# Patient Record
Sex: MALE | Race: WHITE
[De-identification: names, ages, dates, MRNs, and addresses within clinical notes are randomized per-mention and may not be internally consistent; named-entity substitution may affect disease eponyms.]

---

## 2018-04-10 ENCOUNTER — HOSPITAL ENCOUNTER (INPATIENT)
Dept: HOSPITAL 45 - C.EDB | Age: 78
LOS: 9 days | Discharge: HOME | DRG: 286 | End: 2018-04-19
Attending: HOSPITALIST | Admitting: HOSPITALIST
Payer: MEDICARE

## 2018-04-10 VITALS
OXYGEN SATURATION: 96 % | SYSTOLIC BLOOD PRESSURE: 131 MMHG | TEMPERATURE: 99.68 F | DIASTOLIC BLOOD PRESSURE: 81 MMHG | HEART RATE: 67 BPM

## 2018-04-10 VITALS
TEMPERATURE: 97.88 F | SYSTOLIC BLOOD PRESSURE: 132 MMHG | OXYGEN SATURATION: 96 % | HEART RATE: 59 BPM | DIASTOLIC BLOOD PRESSURE: 79 MMHG

## 2018-04-10 VITALS
HEIGHT: 66 IN | WEIGHT: 165.35 LBS | BODY MASS INDEX: 26.57 KG/M2 | BODY MASS INDEX: 26.57 KG/M2 | HEIGHT: 66 IN | WEIGHT: 165.35 LBS

## 2018-04-10 DIAGNOSIS — I35.0: ICD-10-CM

## 2018-04-10 DIAGNOSIS — E87.1: ICD-10-CM

## 2018-04-10 DIAGNOSIS — I11.0: ICD-10-CM

## 2018-04-10 DIAGNOSIS — Z86.73: ICD-10-CM

## 2018-04-10 DIAGNOSIS — Z87.891: ICD-10-CM

## 2018-04-10 DIAGNOSIS — Z98.890: ICD-10-CM

## 2018-04-10 DIAGNOSIS — Z87.01: ICD-10-CM

## 2018-04-10 DIAGNOSIS — Z66: ICD-10-CM

## 2018-04-10 DIAGNOSIS — I50.40: ICD-10-CM

## 2018-04-10 DIAGNOSIS — J18.9: ICD-10-CM

## 2018-04-10 DIAGNOSIS — Z85.038: ICD-10-CM

## 2018-04-10 DIAGNOSIS — I48.2: Primary | ICD-10-CM

## 2018-04-10 DIAGNOSIS — I25.119: ICD-10-CM

## 2018-04-10 DIAGNOSIS — Z91.81: ICD-10-CM

## 2018-04-10 DIAGNOSIS — R33.8: ICD-10-CM

## 2018-04-10 LAB
ALBUMIN SERPL-MCNC: 3 GM/DL (ref 3.4–5)
ALP SERPL-CCNC: 104 U/L (ref 45–117)
ALT SERPL-CCNC: 20 U/L (ref 12–78)
AST SERPL-CCNC: 18 U/L (ref 15–37)
BASOPHILS # BLD: 0.03 K/UL (ref 0–0.2)
BASOPHILS NFR BLD: 0.5 %
BUN SERPL-MCNC: 22 MG/DL (ref 7–18)
CALCIUM SERPL-MCNC: 8.5 MG/DL (ref 8.5–10.1)
CO2 SERPL-SCNC: 24 MMOL/L (ref 21–32)
CREAT SERPL-MCNC: 1.34 MG/DL (ref 0.6–1.4)
EOS ABS #: 0.03 K/UL (ref 0–0.5)
EOSINOPHIL NFR BLD AUTO: 186 K/UL (ref 130–400)
GLUCOSE SERPL-MCNC: 98 MG/DL (ref 70–99)
HCT VFR BLD CALC: 37.2 % (ref 42–52)
HGB BLD-MCNC: 12.4 G/DL (ref 14–18)
IG#: 0.01 K/UL (ref 0–0.02)
IMM GRANULOCYTES NFR BLD AUTO: 6.6 %
INR PPP: 1.1 (ref 0.9–1.1)
LYMPHOCYTES # BLD: 0.42 K/UL (ref 1.2–3.4)
MCH RBC QN AUTO: 30.7 PG (ref 25–34)
MCHC RBC AUTO-ENTMCNC: 33.3 G/DL (ref 32–36)
MCV RBC AUTO: 92.1 FL (ref 80–100)
MONO ABS #: 0.51 K/UL (ref 0.11–0.59)
MONOCYTES NFR BLD: 8 %
NEUT ABS #: 5.41 K/UL (ref 1.4–6.5)
NEUTROPHILS # BLD AUTO: 0.5 %
NEUTROPHILS NFR BLD AUTO: 84.2 %
PMV BLD AUTO: 8.9 FL (ref 7.4–10.4)
POTASSIUM SERPL-SCNC: 4.2 MMOL/L (ref 3.5–5.1)
PROT SERPL-MCNC: 7.3 GM/DL (ref 6.4–8.2)
PTT PATIENT: 33.8 SECONDS (ref 21–31)
RED CELL DISTRIBUTION WIDTH CV: 13.9 % (ref 11.5–14.5)
RED CELL DISTRIBUTION WIDTH SD: 47.3 FL (ref 36.4–46.3)
SODIUM SERPL-SCNC: 133 MMOL/L (ref 136–145)
WBC # BLD AUTO: 6.41 K/UL (ref 4.8–10.8)

## 2018-04-10 RX ADMIN — HEPARIN SODIUM SCH MLS/HR: 5000 INJECTION, SOLUTION INTRAVENOUS at 21:40

## 2018-04-10 RX ADMIN — IPRATROPIUM BROMIDE AND ALBUTEROL SULFATE SCH ML: .5; 3 SOLUTION RESPIRATORY (INHALATION) at 20:00

## 2018-04-10 NOTE — HISTORY AND PHYSICAL
History & Physical


Date & Time of Service:


Apr 10, 2018 at 19:19


Chief Complaint:


Cath Site Pain, Weakness, Cough


Primary Care Physician:


No Doctor, Assigned


History of Present Illness


Source:  patient, clinic records, hospital records


Patient is a pleasant 76 y/o male, with PMHx of urinary retention w/ chronic 

Shane, who presented to the ED because of a cough. Patient is a poor historian, 

no family present. He is visiting his niece from [place]. He states he has a 

productive milky white cough for a few days now. He denies any other 

complaints. He has urinary retention and a chronic Shane in place- he notes 

this AM he had penile spasms, but that has since resolved. He denies following w

/ urology here- his Urologist is in [place]. He states he a fall the other 

night while trying to get out of bed. Denies syncope, lightheadedness/

dizziness. He denies any injuries. Patient denies any fever, chills, sweats, 

lightheadedness, dizziness, vision changes, CP, palpitations, edema, SOB, 

wheezing, abdominal pain, nausea, vomiting, diarrhea, urinary symptoms, melena, 

numbness/tingling, weakness, muscle/joint pain, anxiety/depression, active 

bleeding, or new skin discoloration/changes.





Past Medical/Surgical History


Medical Problems: None 





Surgical History: None





Family History





Cancer


Diabetes mellitus


Heart disease


Hypertension





Social History


Smoking Status:  Former Smoker


Alcohol Use:  none


Drug Use:  marijuana


Marital Status:  


Housing status:  lives with family


Occupational Status:  retired





Allergies


Coded Allergies:  


     No Known Allergies (Unverified , 5/17/14)





Home Medications


Scheduled PRN


Acetaminophen (Tylenol), 650 MG PO BID PRN for Pain or Fever





Physical Exam


Vital Signs











  Date Time  Temp Pulse Resp B/P (MAP) Pulse Ox O2 Delivery O2 Flow Rate FiO2


 


4/10/18 18:30  76 31 149/100 98 Room Air  


 


4/10/18 17:18  83 29 145/97 94 Room Air  


 


4/10/18 17:07  75      


 


4/10/18 15:50 37.5 93 20 121/79 100 Room Air  








General Appearance:  no apparent distress


Head:  normocephalic, atraumatic


Eyes:  normal inspection, PERRL


ENT:  hearing grossly normal


Neck:  supple


Respiratory/Chest:  no respiratory distress, no accessory muscle use, + 

decreased breath sounds (throughout ), + wheezing (mild expiratory wheeze 

throughout )


Cardiovascular:  + irregularly irregular (rate controlled )


Abdomen/GI:  normal bowel sounds, non tender, soft


Back:  normal inspection


Extremities/Musculoskelatal:  no calf tenderness, + swelling (+1 pitting edema 

of BLEs)


Neurologic/Psych:  alert, normal mood/affect, oriented x 3


Skin:  normal color, warm/dry, no rash





Diagnostics


Laboratory Results





Results Past 24 Hours








Test


  4/10/18


16:34 4/10/18


17:30 Range/Units


 


 


White Blood Count 6.41  4.8-10.8  K/uL


 


Red Blood Count 4.04  4.7-6.1  M/uL


 


Hemoglobin 12.4  14.0-18.0  g/dL


 


Hematocrit 37.2  42-52  %


 


Mean Corpuscular Volume 92.1    fL


 


Mean Corpuscular Hemoglobin 30.7  25-34  pg


 


Mean Corpuscular Hemoglobin


Concent 33.3


  


  32-36  g/dl


 


 


Platelet Count 186  130-400  K/uL


 


Mean Platelet Volume 8.9  7.4-10.4  fL


 


Neutrophils (%) (Auto) 84.2   %


 


Lymphocytes (%) (Auto) 6.6   %


 


Monocytes (%) (Auto) 8.0   %


 


Eosinophils (%) (Auto) 0.5   %


 


Basophils (%) (Auto) 0.5   %


 


Neutrophils # (Auto) 5.41  1.4-6.5  K/uL


 


Lymphocytes # (Auto) 0.42  1.2-3.4  K/uL


 


Monocytes # (Auto) 0.51  0.11-0.59  K/uL


 


Eosinophils # (Auto) 0.03  0-0.5  K/uL


 


Basophils # (Auto) 0.03  0-0.2  K/uL


 


RDW Standard Deviation 47.3  36.4-46.3  fL


 


RDW Coefficient of Variation 13.9  11.5-14.5  %


 


Immature Granulocyte % (Auto) 0.2   %


 


Immature Granulocyte # (Auto) 0.01  0.00-0.02  K/uL


 


Prothrombin Time


  12.0


  


  9.0-12.0


SECONDS


 


Prothromb Time International


Ratio 1.1


  


  0.9-1.1  


 


 


Activated Partial


Thromboplast Time 33.8


  


  21.0-31.0


SECONDS


 


Partial Thromboplastin Ratio 1.3   


 


Sodium Level 133  136-145  mmol/L


 


Potassium Level 4.2  3.5-5.1  mmol/L


 


Chloride Level 101    mmol/L


 


Carbon Dioxide Level 24  21-32  mmol/L


 


Anion Gap 8.0  3-11  mmol/L


 


Blood Urea Nitrogen 22  7-18  mg/dl


 


Creatinine


  1.34


  


  0.60-1.40


mg/dl


 


Estimated GFR (


American) 58.0


  


   


 


 


Estimated GFR (Non-


American 50.0


  


   


 


 


BUN/Creatinine Ratio 16.1  10-20  


 


Random Glucose 98  70-99  mg/dl


 


Calcium Level 8.5  8.5-10.1  mg/dl


 


Magnesium Level 2.0  1.8-2.4  mg/dl


 


Total Bilirubin 0.8  0.2-1  mg/dl


 


Direct Bilirubin 0.3  0-0.2  mg/dl


 


Aspartate Amino Transf


(AST/SGOT) 18


  


  15-37  U/L


 


 


Alanine Aminotransferase


(ALT/SGPT) 20


  


  12-78  U/L


 


 


Alkaline Phosphatase 104    U/L


 


Troponin I 0.054  0-0.045  ng/ml


 


Total Protein 7.3  6.4-8.2  gm/dl


 


Albumin 3.0  3.4-5.0  gm/dl


 


Urine Color  DK YELLOW  


 


Urine Appearance  CLOUDY CLEAR  


 


Urine pH  5.5 4.5-7.5  


 


Urine Specific Gravity  1.019 1.000-1.030  


 


Urine Protein  3+ NEG  


 


Urine Glucose (UA)  NEG NEG  


 


Urine Ketones  NEG NEG  


 


Urine Occult Blood  3+ NEG  


 


Urine Nitrite  NEG NEG  


 


Urine Bilirubin  NEG NEG  


 


Urine Urobilinogen  NEG NEG  


 


Urine Leukocyte Esterase  MODERATE NEG  


 


Urine WBC (Auto)  >30 0-5  /hpf


 


Urine RBC (Auto)  >30 0-4  /hpf


 


Urine Hyaline Casts (Auto)  1-5 0-5  /lpf


 


Urine Epithelial Cells (Auto)  5-10 0-5  /lpf


 


Urine Bacteria (Auto)  NEG NEG  








Microbiology Results


4/10/18 Blood Culture, Received


          Pending


4/10/18 Blood Culture, Received


          Pending





Diagnostic Radiology


TWO VIEW CHEST





CLINICAL HISTORY: Cough.





FINDINGS: AP and lateral chest radiographs are obtained. No prior studies are


available for comparison at the time of dictation. The AP views degraded by


patient rotation. The heart is enlarged. There diffuse bilateral airspace


opacities, greatest in the left upper and right lower lobes. No large pleural


effusion is identified.  There is no pneumothorax. The skeletal structures are


osteopenic. Bony thorax appears intact.





IMPRESSION:





1. Cardiomegaly.





2. There are multifocal bilateral airspace opacities. Is present pulmonary edema


and/or multifocal pneumonia. Clinical and laboratory correlation will be


required. Radiographic follow-up to resolution is recommended.











Electronically signed by:  Duc Momin M.D.


4/10/2018 5:52 PM





Dictated Date/Time:  4/10/2018 5:50 PM





 The status of this report is Signed.   


 Draft = Not yet reviewed or approved by Radiologist.  


 Signed = Reviewed and approved by Radiologist.





EKG


BIANKA GOMEZ ID:[id number] 10-APR-2018 16:24:12 [facility]


Atrial fibrillation


Right bundle branch block


Left anterior fascicular block


*** Bifascicular block ***


Septal infarct , age undetermined


Abnormal ECG


No previous ECGs available


25mm/s 10mm/mV 150Hz 8.0 SP2 12SL 241 DRAGAN: 0


Referred by: Referred Self Unconfirmed


Vent. rate 73 BPM


MS interval * ms


QRS duration 154 ms


QT/QTc 440/484 ms


P-R-T axes * -72 78


1940 (77 yr)


Male 


Room:[facility]


Loc:15


Technician:ROSALEE YAÑEZ


Test ind:





BIANKA GOMEZ ID:[id number] 10-APR-2018 18:27:20 [facility]


Undetermined rhythm


Left axis deviation


Right bundle branch block


Abnormal ECG


When compared with ECG of 10-APR-2018 16:24, (unconfirmed)


Current undetermined rhythm precludes rhythm comparison, needs review


25mm/s 10mm/mV 150Hz 8.0 SP2 12SL 241 DRAGAN: 3


Referred by: Referred Self Unconfirmed


Vent. rate 74 BPM


MS interval * ms


QRS duration 136 ms


QT/QTc 436/483 ms


P-R-T axes * -70 62


1940 (77 yr)


Male 


Room:


Loc:15


Technician:Herminia Charles


Test ind:





Impression


Assessment and Plan


Patient is a pleasant 76 y/o male, with PMHx of urinary retention w/ chronic 

Shane, who presented to the ED because of a cough. 





Multifocal PNA vs ?CHF, new onset a.fib- rates controlled, elevated troponin:


- Admit to tele for cardiac monitoring


- Trend cardiac enzymes 


- Obtain ECHO 


- Start IV Heparin gtt with plans to transition to PO anticoagulation 


- IV Metoprolol PRN for HR >120 


- IV Levaquin 


- Nitro and IV Morphine PRN for chest pain 


- DuoNebs QID and PRN for SOB/wheezing 


- BCx, sputum culture, MRSA swab pending 


- Check TSH, lipid panel, hgbA1c 


- Check bNP





Mild hyponatremia: Follow PRP 





Urinary retention w/ indwelling Shane- noted 





HTN: IV Hydralazine PRN 





DVT prophylaxis: Heparin gtt 





Code status: LEVEL V, DNR 





Dispo: Visiting w/ niece, from [place]- PT/OT and CM consulted 





Attending Documentation:


Patient seen and examined, chart reviewed, case discussed with AZAR Beavers and I 

agree with her assessment and plan as documented above.  Briefly, patient is a 

78yo male with history of urinary retention with chronic Shane in place who 

presents to the ER with productive cough x 4 days, decreased appetite and 

penile spasm.


On physical exam he is afebrile, hemodynamically stable.  Pleasant elderly 

gentleman in NAD. HEENT exam is unremarkable.  Neck supple, no JVD.  Heart +S1/

S2, irregularly irregular, 3/6 MAURICIO at apex with radiation to back, no rubs/

gallops.  Lungs with bilateral crackles to mid lung fields.  2+ pitting LE 

edema to the knees equal bilaterally. Shane catheter in place.


Labs significant for normocytic/normochromic anemia, Hg=12.4 and Hct=37.2, 

Elevated troponin at 0.054. +UA.  


CXR suggestive for multifocal PNA.  EKG with AF at 73 with bifascicular block 

and ST depressions


Impression:  78yo male with productive cough, new onset AF and mildly elevated 

troponin


1.    ?PNA vs CHF  check BNP, 2D echo, cultures.  Levaquin for PNA


2.   AF  patient reports no prior cardiac history.  Rate controlled at present.

  Will anticoagulate with heparin gtt for now. Metoprolol PRN  Check TSH, trend 

cardiac enzymes, monitor electrolytes.  Cardiology consultation per discretion 

of day team


3.   Elevated troponin monitor cardiac enzymes, ASA 81mg po daily, Statin 


4.   Remainder of plan as above





Resuscitation Status


LEVEL V, DNR





VTE Prophylaxis


Will order VTE Prophylaxis:  Yes

## 2018-04-10 NOTE — DIAGNOSTIC IMAGING REPORT
TWO VIEW CHEST



CLINICAL HISTORY: Cough.



FINDINGS: AP and lateral chest radiographs are obtained. No prior studies are

available for comparison at the time of dictation. The AP views degraded by

patient rotation. The heart is enlarged. There diffuse bilateral airspace

opacities, greatest in the left upper and right lower lobes. No large pleural

effusion is identified.  There is no pneumothorax. The skeletal structures are

osteopenic. Bony thorax appears intact.



IMPRESSION:



1. Cardiomegaly.



2. There are multifocal bilateral airspace opacities. Is present pulmonary edema

and/or multifocal pneumonia. Clinical and laboratory correlation will be

required. Radiographic follow-up to resolution is recommended.







Electronically signed by:  Duc Momin M.D.

4/10/2018 5:52 PM



Dictated Date/Time:  4/10/2018 5:50 PM

## 2018-04-10 NOTE — EMERGENCY ROOM VISIT NOTE
History


Report prepared by Nader:  Porfirio Mueller


Under the Supervision of:  Dr. Timo Jacinto M.D.


First contact with patient:  16:06


Chief Complaint:  OTHER COMPLAINT


Stated Complaint:  CATH SITE PAIN, WEAKNESS, COUGH





History of Present Illness


The patient is a 77 year old male who presents to the Emergency Room with 

complaints of constant penile pain around the entry site of his urinary 

catheter that began on Friday 4 days ago. He denies any leaking or discharge 

from the penis. The patient has had the catheter placed since 2013, secondary 

to chronic urinary issues. The catheter was most recently changed 2 weeks ago 

by urology. The patient's daughter notes that he does follow with Dr. Garg, 

who the patient will see later this month to see if a suprapubic catheter would 

be more appropriate. In January, 3 months ago, the patient was diagnosed with a 

UTI and chronic kidney failure at a hospital in [place]. He was told that his 

kidney failure was due to Ibuprofen usage. He has not had any Ibuprofen since 

this diagnosis. He has also had a persistent cough that is producing a "milky" 

mucous per the daughter. There has not been any fevers or chest pain.  He has 

been slightly short of breath.  The daughter continued to mention that his 

breathing seemed "faster" last night. The patient did experience a falling 

episode last night as he was trying to get out of bed. He slid down onto his 

but and denies hitting his head or injuring his neck. The swelling in his feet 

seems to be worsening as well.





   Source of History:  patient, family


   Onset:  4 days ago


   Position:  other ()


   Quality:  other (Penis pain around catheter site)


   Timing:  constant


   Associated Symptoms:  + cough, + SOB, No fevers, No headache, No neck pain, 

No chest pain





Review of Systems


See HPI for pertinent positives & negatives. A total of 10 systems reviewed and 

were otherwise negative.





Past Medical & Surgical


Medical Problems:


(1) A-fib


(2) HTN (hypertension)


(3) PNA (pneumonia)


(4) Skin problems


(5) Stomach problems


(6) Ulcer


(7) Urinary problem








Family History





Cancer


Diabetes mellitus


Heart disease


Hypertension





Social History


Smoking Status:  Former Smoker


Marital Status:  


Housing Status:  unknown


Occupation Status:  retired





Current/Historical Medications


Scheduled PRN


Acetaminophen (Tylenol), 650 MG PO BID PRN for Pain or Fever





Allergies


Coded Allergies:  


     No Known Allergies (Unverified , 5/17/14)





Physical Exam


Vital Signs











  Date Time  Temp Pulse Resp B/P (MAP) Pulse Ox O2 Delivery O2 Flow Rate FiO2


 


4/10/18 18:30  76 31 149/100 98 Room Air  


 


4/10/18 17:18  83 29 145/97 94 Room Air  


 


4/10/18 17:07  75      


 


4/10/18 15:50 37.5 93 20 121/79 100 Room Air  











Physical Exam





Constitutional: Vital signs reviewed.


Eyes: Pupils are equal round reactive to light.  Conjunctiva are noninjected.  


ENT: Pharynx is clear without erythema or exudate.  Mucous membranes are moist.

  Neck supple without meningeal signs.


Respiratory: Clear to auscultation bilaterally.  Breath sounds are equal 

bilaterally. 


Cardiovascular: Regular rate and rhythm.  No rubs or gallops.


GI: Soft, nondistended and nontender.  Bowel sounds are present.


Musculoskeletal: Mild bilateral pedal edema.  No lower extremity tenderness. No 

flank or rib tenderness. 


Integumentary: No cyanosis.


Neurological: The patient is awake and alert.  No focal deficits.


Psychiatric: Normal affect.


: The phallus shows some whitish coating, with some mild erythema and 

tenderness. There is a Shane catheter in place without any bleeding or 

discharge.





Medical Decision & Procedures


ER Provider


Diagnostic Interpretation:


Radiology results as stated below per my review and the radiologist's 

interpretation:





TWO VIEW CHEST





CLINICAL HISTORY: Cough.





FINDINGS: AP and lateral chest radiographs are obtained. No prior studies are


available for comparison at the time of dictation. The AP views degraded by


patient rotation. The heart is enlarged. There diffuse bilateral airspace


opacities, greatest in the left upper and right lower lobes. No large pleural


effusion is identified.  There is no pneumothorax. The skeletal structures are


osteopenic. Bony thorax appears intact.





IMPRESSION:





1. Cardiomegaly.





2. There are multifocal bilateral airspace opacities. Is present pulmonary edema


and/or multifocal pneumonia. Clinical and laboratory correlation will be


required. Radiographic follow-up to resolution is recommended.











Electronically signed by:  Duc Momin M.D.


4/10/2018 5:52 PM





Dictated Date/Time:  4/10/2018 5:50 PM





Laboratory Results


4/10/18 16:34








Red Blood Count 4.04, Mean Corpuscular Volume 92.1, Mean Corpuscular Hemoglobin 

30.7, Mean Corpuscular Hemoglobin Concent 33.3, Mean Platelet Volume 8.9, 

Neutrophils (%) (Auto) 84.2, Lymphocytes (%) (Auto) 6.6, Monocytes (%) (Auto) 

8.0, Eosinophils (%) (Auto) 0.5, Basophils (%) (Auto) 0.5, Neutrophils # (Auto) 

5.41, Lymphocytes # (Auto) 0.42, Monocytes # (Auto) 0.51, Eosinophils # (Auto) 

0.03, Basophils # (Auto) 0.03





4/10/18 16:34

















Test


  4/10/18


16:34 4/10/18


17:30


 


White Blood Count


  6.41 K/uL


(4.8-10.8) 


 


 


Red Blood Count


  4.04 M/uL


(4.7-6.1) 


 


 


Hemoglobin


  12.4 g/dL


(14.0-18.0) 


 


 


Hematocrit 37.2 % (42-52)  


 


Mean Corpuscular Volume


  92.1 fL


() 


 


 


Mean Corpuscular Hemoglobin


  30.7 pg


(25-34) 


 


 


Mean Corpuscular Hemoglobin


Concent 33.3 g/dl


(32-36) 


 


 


Platelet Count


  186 K/uL


(130-400) 


 


 


Mean Platelet Volume


  8.9 fL


(7.4-10.4) 


 


 


Neutrophils (%) (Auto) 84.2 %  


 


Lymphocytes (%) (Auto) 6.6 %  


 


Monocytes (%) (Auto) 8.0 %  


 


Eosinophils (%) (Auto) 0.5 %  


 


Basophils (%) (Auto) 0.5 %  


 


Neutrophils # (Auto)


  5.41 K/uL


(1.4-6.5) 


 


 


Lymphocytes # (Auto)


  0.42 K/uL


(1.2-3.4) 


 


 


Monocytes # (Auto)


  0.51 K/uL


(0.11-0.59) 


 


 


Eosinophils # (Auto)


  0.03 K/uL


(0-0.5) 


 


 


Basophils # (Auto)


  0.03 K/uL


(0-0.2) 


 


 


RDW Standard Deviation


  47.3 fL


(36.4-46.3) 


 


 


RDW Coefficient of Variation


  13.9 %


(11.5-14.5) 


 


 


Immature Granulocyte % (Auto) 0.2 %  


 


Immature Granulocyte # (Auto)


  0.01 K/uL


(0.00-0.02) 


 


 


Prothrombin Time


  12.0 SECONDS


(9.0-12.0) 


 


 


Prothromb Time International


Ratio 1.1 (0.9-1.1) 


  


 


 


Activated Partial


Thromboplast Time 33.8 SECONDS


(21.0-31.0) 


 


 


Partial Thromboplastin Ratio 1.3  


 


Anion Gap


  8.0 mmol/L


(3-11) 


 


 


Estimated GFR (


American) 58.0 


  


 


 


Estimated GFR (Non-


American 50.0 


  


 


 


BUN/Creatinine Ratio 16.1 (10-20)  


 


Calcium Level


  8.5 mg/dl


(8.5-10.1) 


 


 


Magnesium Level


  2.0 mg/dl


(1.8-2.4) 


 


 


Total Bilirubin


  0.8 mg/dl


(0.2-1) 


 


 


Direct Bilirubin


  0.3 mg/dl


(0-0.2) 


 


 


Aspartate Amino Transf


(AST/SGOT) 18 U/L (15-37) 


  


 


 


Alanine Aminotransferase


(ALT/SGPT) 20 U/L (12-78) 


  


 


 


Alkaline Phosphatase


  104 U/L


() 


 


 


Troponin I


  0.054 ng/ml


(0-0.045) 


 


 


Pro-B-Type Natriuretic Peptide


  > 02620 pg/ml


(0-1800) 


 


 


Total Protein


  7.3 gm/dl


(6.4-8.2) 


 


 


Albumin


  3.0 gm/dl


(3.4-5.0) 


 


 


Thyroid Stimulating Hormone


(TSH) 0.787 uIu/ml


(0.300-4.500) 


 


 


Urine Color  DK YELLOW 


 


Urine Appearance  CLOUDY (CLEAR) 


 


Urine pH  5.5 (4.5-7.5) 


 


Urine Specific Gravity


  


  1.019


(1.000-1.030)


 


Urine Protein  3+ (NEG) 


 


Urine Glucose (UA)  NEG (NEG) 


 


Urine Ketones  NEG (NEG) 


 


Urine Occult Blood  3+ (NEG) 


 


Urine Nitrite  NEG (NEG) 


 


Urine Bilirubin  NEG (NEG) 


 


Urine Urobilinogen  NEG (NEG) 


 


Urine Leukocyte Esterase  MODERATE (NEG) 


 


Urine WBC (Auto)  >30 /hpf (0-5) 


 


Urine RBC (Auto)  >30 /hpf (0-4) 


 


Urine Hyaline Casts (Auto)  1-5 /lpf (0-5) 


 


Urine Epithelial Cells (Auto)


  


  5-10 /lpf


(0-5)


 


Urine Bacteria (Auto)  NEG (NEG) 





Laboratory results as reviewed by me.





Medications Administered











 Medications


  (Trade)  Dose


 Ordered  Sig/Hudson


 Route  Start Time


 Stop Time Status Last Admin


Dose Admin


 


 Levofloxacin


  (Levaquin / D5W)  750 mg  NOW  STAT


 IV  4/10/18 18:08


 4/10/18 18:09 DC 4/10/18 18:42


750 MG


 


 Aspirin


  (Aspirin Chew)  324 mg  NOW  STAT


 PO  4/10/18 18:19


 4/10/18 18:20 DC 4/10/18 18:42


324 MG











ECG Per My Interpretation


Indication:  SOB/dyspnea


Rate (beats per minute):  73


Rhythm:  atrial fibrillation


Findings:  other (bifascicular block with ST depression, biphasic t-waves 

septally. )


Change:


REPEAT EKG: Shows sinus rhythm with frequent PVCs, atrial-fibrillation 74, RBBB

, PVCs present, ST depressions resolved.





ED Course


1609: The patient was evaluated in room B12B. A complete history and physical 

exam was performed.





1808: Ordered Levaquin 750 mg IV. 





1812: I discussed the case with the patient and his daughter. He is not, and 

has not, had any chest discomfort. 





1819: Ordered Aspirin 324 mg PO. 





1843: I discussed the case with the patient at this time. There is no prior 

history of atrial fibrillation or contraindication for anticoagulation therapy. 

I discussed the ECG changes with him. I discussed the case with Dr. Blaine DELGADO Hospitalist. She states she would start the patient on anticoagulation 

therapy after addressing his fall risk.





Medical Decision


This is a 77-year-old male who presents with cough, shortness of breath, pain 

at his Shane catheter.  Differential diagnosis includes pneumonia, cardiac, UTI

, balanitis, bronchitis.  I did perform a limited focused review of portions of 

the patient's old chart on the electronic medical record. The patient has had 

no recent pertinent visits to this hospital.





I did evaluate the patient as noted above.  The patient has several issues 

today.  He has pain on his Shane catheter.  He appears to have some balanitis.  

His Shane catheter was changed.  He also complains of cough with shortness of 

breath.  His family has been ill with similar symptoms.  IV access was 

established.  The patient was placed on a continuous cardiac monitor.  I did 

order and personally review the patient's 12-lead EKG and chest x-ray as 

described above.  His twelve-lead EKG shows atrial fibrillation.  He states he 

has never had atrial fibrillation.  His daughter confirms this.  He also has 

some ST depressions and biphasic T waves.  His chest x-ray shows multifocal 

pneumonia.  Blood cultures were ordered.  I did treat the patient with IV 

Levaquin.  I did order and review the patient's blood work as noted in the 

electronic medical record.  His troponin is elevated.  I did reassess the 

patient.  I did discuss the test results with the patient.  He denies ever 

having any chest discomfort.  He denies having atrial fibrillation.  I did 

repeat another twelve-lead EKG which showed improvement of the ST elevations 

but he did have continued atrial fibrillation.  I did recommend admission to 

the hospital for IV antibiotics as well as cardiology consultation and 

anticoagulation.  I did discuss the case with the hospitalist and .

  The hospitalist will determine which anticoagulant to start.





Medication Reconcilliation


Current Medication List:  was personally reviewed by me





Blood Pressure Screening


Patient's blood pressure:  Normal blood pressure





Consults


Time Called:  1838


Consulting Physician:  Dr. Blaine DELGADO Hospitalist


Returned Call:  [phone]


Dr. Blaine DELGADO Hospitalist. She states she would start the patient on 

anticoagulation therapy after addressing his fall risk.





Impression





 Primary Impression:  


 Multifocal pneumonia


 Additional Impressions:  


 Abnormal ECG


 Elevated troponin I level


 Catheter-associated urinary tract infection


 New onset a-fib





Scribe Attestation


The scribe's documentation has been prepared under my direct and personally 

reviewed by me in its entirety. I confirm that the note above accurately 

reflects all work, treatment, procedures, and medical decision making performed 

by me.





Departure Information


Dispostion


Being Evaluated By Hospitalist





Referrals


No Doctor, Assigned (PCP)





Patient Instructions


My [facility]





Problem Qualifiers








 Additional Impressions:  


 Catheter-associated urinary tract infection


 Indwelling urinary catheter type:  indwelling urethral catheter  Encounter type

:  initial encounter  Qualified Codes:  T83.511A - Infection and inflammatory 

reaction due to indwelling urethral catheter, initial encounter; N39.0 - 

Urinary tract infection, site not specified

## 2018-04-11 VITALS
DIASTOLIC BLOOD PRESSURE: 92 MMHG | OXYGEN SATURATION: 94 % | TEMPERATURE: 98.24 F | HEART RATE: 66 BPM | SYSTOLIC BLOOD PRESSURE: 152 MMHG

## 2018-04-11 VITALS
SYSTOLIC BLOOD PRESSURE: 136 MMHG | TEMPERATURE: 98.78 F | DIASTOLIC BLOOD PRESSURE: 74 MMHG | HEART RATE: 57 BPM | OXYGEN SATURATION: 96 %

## 2018-04-11 VITALS
OXYGEN SATURATION: 99 % | SYSTOLIC BLOOD PRESSURE: 170 MMHG | DIASTOLIC BLOOD PRESSURE: 97 MMHG | HEART RATE: 67 BPM | TEMPERATURE: 98.24 F

## 2018-04-11 VITALS
TEMPERATURE: 98.06 F | OXYGEN SATURATION: 97 % | HEART RATE: 57 BPM | DIASTOLIC BLOOD PRESSURE: 81 MMHG | SYSTOLIC BLOOD PRESSURE: 178 MMHG

## 2018-04-11 VITALS — HEART RATE: 78 BPM | OXYGEN SATURATION: 96 %

## 2018-04-11 VITALS
DIASTOLIC BLOOD PRESSURE: 91 MMHG | SYSTOLIC BLOOD PRESSURE: 141 MMHG | TEMPERATURE: 99.5 F | HEART RATE: 64 BPM | OXYGEN SATURATION: 96 %

## 2018-04-11 VITALS
TEMPERATURE: 99.68 F | OXYGEN SATURATION: 98 % | DIASTOLIC BLOOD PRESSURE: 87 MMHG | SYSTOLIC BLOOD PRESSURE: 147 MMHG | HEART RATE: 65 BPM

## 2018-04-11 VITALS — OXYGEN SATURATION: 97 %

## 2018-04-11 VITALS — OXYGEN SATURATION: 96 % | HEART RATE: 75 BPM

## 2018-04-11 VITALS — OXYGEN SATURATION: 98 %

## 2018-04-11 VITALS — OXYGEN SATURATION: 99 %

## 2018-04-11 VITALS — HEART RATE: 76 BPM | OXYGEN SATURATION: 98 %

## 2018-04-11 LAB
BUN SERPL-MCNC: 22 MG/DL (ref 7–18)
CALCIUM SERPL-MCNC: 8.7 MG/DL (ref 8.5–10.1)
CK MB SERPL-MCNC: 0.6 NG/ML (ref 0.5–3.6)
CK MB SERPL-MCNC: 0.9 NG/ML (ref 0.5–3.6)
CO2 SERPL-SCNC: 24 MMOL/L (ref 21–32)
CREAT SERPL-MCNC: 1.27 MG/DL (ref 0.6–1.4)
EOSINOPHIL NFR BLD AUTO: 174 K/UL (ref 130–400)
GLUCOSE SERPL-MCNC: 84 MG/DL (ref 70–99)
HBA1C MFR BLD: 5 % (ref 4.5–5.6)
HCT VFR BLD CALC: 36.1 % (ref 42–52)
HGB BLD-MCNC: 12 G/DL (ref 14–18)
KETONES UR QL STRIP: 33 MG/DL
MCH RBC QN AUTO: 30.3 PG (ref 25–34)
MCHC RBC AUTO-ENTMCNC: 33.2 G/DL (ref 32–36)
MCV RBC AUTO: 91.2 FL (ref 80–100)
PH UR: 101 MG/DL (ref 0–200)
PMV BLD AUTO: 8.7 FL (ref 7.4–10.4)
POTASSIUM SERPL-SCNC: 4.1 MMOL/L (ref 3.5–5.1)
PTT PATIENT: 47.4 SECONDS (ref 21–31)
RED CELL DISTRIBUTION WIDTH CV: 13.8 % (ref 11.5–14.5)
RED CELL DISTRIBUTION WIDTH SD: 46.5 FL (ref 36.4–46.3)
SODIUM SERPL-SCNC: 132 MMOL/L (ref 136–145)
WBC # BLD AUTO: 4.87 K/UL (ref 4.8–10.8)

## 2018-04-11 RX ADMIN — HEPARIN SODIUM SCH MLS/HR: 5000 INJECTION, SOLUTION INTRAVENOUS at 18:05

## 2018-04-11 RX ADMIN — Medication SCH MG: at 07:45

## 2018-04-11 RX ADMIN — IPRATROPIUM BROMIDE AND ALBUTEROL SULFATE SCH ML: .5; 3 SOLUTION RESPIRATORY (INHALATION) at 11:38

## 2018-04-11 RX ADMIN — ACETYLCYSTEINE SCH MG: 100 INHALANT RESPIRATORY (INHALATION) at 20:45

## 2018-04-11 RX ADMIN — IPRATROPIUM BROMIDE AND ALBUTEROL SULFATE SCH ML: .5; 3 SOLUTION RESPIRATORY (INHALATION) at 15:16

## 2018-04-11 RX ADMIN — IPRATROPIUM BROMIDE AND ALBUTEROL SULFATE SCH ML: .5; 3 SOLUTION RESPIRATORY (INHALATION) at 07:05

## 2018-04-11 RX ADMIN — IPRATROPIUM BROMIDE AND ALBUTEROL SULFATE SCH ML: .5; 3 SOLUTION RESPIRATORY (INHALATION) at 19:04

## 2018-04-11 NOTE — CARDIOLOGY CONSULTATION
Cardiology Consultation


Date of Consultation:


Apr 11, 2018.


Requesting Physician:


Deann Weathers


Reason for Consultation:


AF, AS


Pt evaluation today including:  conversation w/ patient, conversation w/ family

, physical exam, lab review, review of studies, review of inpatient medication 

list


History of Present Illness


This is a very pleasant 77-year-old gentleman who lives in [place] but has had 

healthcare in other states but is currently visiting his niece here in 

[place].  He has had urologic problems due to an indwelling catheter, but 

has not been seen by cardiology locally.  I do not have good records of his 

prior cardiovascular history, he initially told me that he was not aware of 

having any trouble with his heart however his niece tells me that he was 

evaluated in [place] and was told that he did have some cardiac problems (she 

did not know specifics) but refused evaluation at that time.





He describes doing well physically until the last year so when he has had a lot 

of difficulties.  Specifically he has less ability to do activities then he 

could in the past, he really cannot live by himself anymore and he has 

difficulty raising his right arm overhead and his speech has become more 

difficult to understand according to his knees.  All of these things occurred 

over the last year, perhaps 6 months.  He does not have lightheadedness or 

dizziness, he has no awareness of his heart rhythm and he does not have 

exertional chest discomfort that he will admit.  On admission here for urologic 

issues he was noted to be in atrial fibrillation and have some congestive heart 

failure findings on chest x-ray.  He also has significant valvular heart 

disease.





Past Medical/Surgical History





(1) HTN (hypertension)


(2) Ulcer


(3) Urinary problem





Family History





Cancer


Diabetes mellitus


Heart disease


Hypertension





Social History


Smoking Status:  Former Smoker


History of Alcohol Use:  No





Review of Systems


Constitutional:  No fever, No weight loss, No weakness


Respiratory:  + shortness of breath, + dyspnea on exertion, No cough, No 

wheezing


Cardiac:  No chest pain, No orthopnea, No PND, No edema, No palpitations


Abdomen:  No pain, No nausea, No vomiting, No diarrhea, No GI bleeding


Male :  No urinary frequency, No nocturia more than once/night, No slowing 

stream, No sexual dysfunction


Neurologic:  No paralysis, No weakness, No numbness/tingling, No balance 

problems


Heme:  No abnormal bleeding/bruising, No clotting problems


Endo:  No fatigue


Skin:  No problem reported


Inability to raise his right arm above his shoulder or use it normally


All Other Systems:  Reviewed and Negative





Allergies


Coded Allergies:  


     No Known Allergies (Unverified , 5/17/14)





Medications





Current Inpatient Medications








 Medications


  (Trade)  Dose


 Ordered  Sig/Hudson


 Route  Start Time


 Stop Time Status Last Admin


Dose Admin


 


 Acetaminophen


  (Tylenol Tab)  650 mg  Q4H  PRN


 PO  4/10/18 19:15


 5/10/18 19:14   


 


 


 Al Hydrox/Mg


 Hydrox/Simethicone


  (Maalox Max Susp)  15 ml  Q4H  PRN


 PO  4/10/18 19:15


 5/10/18 19:14   


 


 


 Magnesium


 Hydroxide


  (Milk Of


 Magnesia Susp)  30 ml  Q12H  PRN


 PO  4/10/18 19:15


 5/10/18 19:14   


 


 


 Ondansetron HCl


  (Zofran Inj)  4 mg  Q6H  PRN


 IV  4/10/18 19:15


 5/10/18 19:14   


 


 


 Nitroglycerin


  (Nitrostat Tab)  0.4 mg  UD  PRN


 SL  4/10/18 19:15


 5/10/18 19:14   


 


 


 Morphine Sulfate


  (MoRPHine


 SULFATE INJ)  2 mg  Q30M  PRN


 IV  4/10/18 19:15


 4/24/18 19:14   


 


 


 Polyethylene


  (Miralax Powder


 Packet)  17 gm  DAILY  PRN


 PO  4/10/18 19:15


 5/10/18 19:14   


 


 


 Albuterol/


 Ipratropium


  (Duoneb)  3 ml  QIDR


 INH  4/10/18 20:00


 5/10/18 19:59  4/11/18 11:38


3 ML


 


 Albuterol/


 Ipratropium


  (Duoneb)  3 ml  Q4H  PRN


 INH  4/10/18 19:15


 5/10/18 19:14   


 


 


 Levofloxacin 750


 mg/Prmx  150 ml @ 


 100 mls/hr  Q24H


 IV  4/11/18 18:00


 4/17/18 17:59   


 


 


 Hydralazine HCl


  (HydrALAZINE INJ)  10 mg  Q6H  PRN


 IV.  4/10/18 19:15


 5/10/18 19:14   


 


 


 Metoprolol


 Tartrate


  (Lopressor Iv)  5 mg  Q6H  PRN


 IV  4/10/18 19:30


 5/10/18 19:29   


 


 


 Aspirin


  (Aspirin Chew)  81 mg  DAILY


 PO  4/11/18 09:00


 5/11/18 08:59  4/11/18 07:45


81 MG


 


 Atorvastatin


 Calcium


  (Lipitor Tab)  80 mg  QAM


 PO  4/11/18 09:00


 5/11/18 08:59  4/11/18 07:45


80 MG


 


 Heparin Sodium/


 Dextrose  500 ml @ 


 24 mls/hr  [id number]


 IV  4/10/18 21:15


 5/10/18 21:14  4/10/18 21:40


24 MLS/HR











Physical Exam





Vital Signs Past 12 Hours








  Date Time  Temp Pulse Resp B/P (MAP) Pulse Ox O2 Delivery O2 Flow Rate FiO2


 


4/11/18 11:38  75 16  96 Room Air  


 


4/11/18 11:27 37.5 64 19 141/91 (108) 96 Room Air  


 


4/11/18 08:00     97 Room Air  


 


4/11/18 07:48 36.7 57 18 178/81 (113) 97   


 


4/11/18 07:09  78 18  96 Room Air  


 


4/11/18 04:00      Room Air  


 


4/11/18 03:40 36.8 66 20 152/92 (112) 94 Room Air  


 


4/10/18 23:59      Room Air  








Constitutional:  


   General Apperance:  heathly-appearing


   Level of Distress:  NAD


Psychiatric:  


   Mental Status:  active & alert


Head:  normocephalic


Eyes:  


   EOM:  EOMI


ENMT:  normal ENT inspection, hearing grossly normal


Neck:  supple, no masses


Lungs:  


   Respiratory effort:  no dyspnea, good air movement


   Auscultation:  no wheezing, rales/crackles on the left, rales/crackles on 

the right


Cardiovascular:  


   Heart Auscultation:  no rubs, no gallops, II/VI MAURICIO, II/VI WSM, irregular 

rate rhythm


Peripheral Pulses:  


   Bruits:  none appreciated


Abdomen:  


   Bowel Sounds:  normal


   Inspection & Palpation:  soft, no tenderness, guarding & rebound, no masses


Musculoskeletal:  pertinent finding (He cannot lift his right arm very well 

without assisting with his left, he does not seem to have a normal right hand 

 either)


Extremities:  no edema


Neurologic:  


   Cranial Nerves:  grossly intact


   Sensation:  grossly intact





Data


Laboratory Results:





Last 24 Hours








Test


  4/10/18


16:34 4/10/18


17:30 4/11/18


00:18 4/11/18


03:30


 


White Blood Count 6.41 K/uL    4.87 K/uL 


 


Red Blood Count 4.04 M/uL    3.96 M/uL 


 


Hemoglobin 12.4 g/dL    12.0 g/dL 


 


Hematocrit 37.2 %    36.1 % 


 


Mean Corpuscular Volume 92.1 fL    91.2 fL 


 


Mean Corpuscular Hemoglobin 30.7 pg    30.3 pg 


 


Mean Corpuscular Hemoglobin


Concent 33.3 g/dl 


  


  


  33.2 g/dl 


 


 


Platelet Count 186 K/uL    174 K/uL 


 


Mean Platelet Volume 8.9 fL    8.7 fL 


 


Neutrophils (%) (Auto) 84.2 %    


 


Lymphocytes (%) (Auto) 6.6 %    


 


Monocytes (%) (Auto) 8.0 %    


 


Eosinophils (%) (Auto) 0.5 %    


 


Basophils (%) (Auto) 0.5 %    


 


Neutrophils # (Auto) 5.41 K/uL    


 


Lymphocytes # (Auto) 0.42 K/uL    


 


Monocytes # (Auto) 0.51 K/uL    


 


Eosinophils # (Auto) 0.03 K/uL    


 


Basophils # (Auto) 0.03 K/uL    


 


RDW Standard Deviation 47.3 fL    46.5 fL 


 


RDW Coefficient of Variation 13.9 %    13.8 % 


 


Immature Granulocyte % (Auto) 0.2 %    


 


Immature Granulocyte # (Auto) 0.01 K/uL    


 


Prothrombin Time 12.0 SECONDS    


 


Prothromb Time International


Ratio 1.1 


  


  


  


 


 


Activated Partial


Thromboplast Time 33.8 SECONDS 


  


  


  47.4 SECONDS 


 


 


Partial Thromboplastin Ratio 1.3    1.8 


 


Sodium Level 133 mmol/L    132 mmol/L 


 


Potassium Level 4.2 mmol/L    4.1 mmol/L 


 


Chloride Level 101 mmol/L    101 mmol/L 


 


Carbon Dioxide Level 24 mmol/L    24 mmol/L 


 


Anion Gap 8.0 mmol/L    7.0 mmol/L 


 


Blood Urea Nitrogen 22 mg/dl    22 mg/dl 


 


Creatinine 1.34 mg/dl    1.27 mg/dl 


 


Estimated GFR (


American) 58.0 


  


  


  61.9 


 


 


Estimated GFR (Non-


American 50.0 


  


  


  53.4 


 


 


BUN/Creatinine Ratio 16.1    17.5 


 


Random Glucose 98 mg/dl    84 mg/dl 


 


Calcium Level 8.5 mg/dl    8.7 mg/dl 


 


Magnesium Level 2.0 mg/dl    


 


Total Bilirubin 0.8 mg/dl    


 


Direct Bilirubin 0.3 mg/dl    


 


Aspartate Amino Transf


(AST/SGOT) 18 U/L 


  


  


  


 


 


Alanine Aminotransferase


(ALT/SGPT) 20 U/L 


  


  


  


 


 


Alkaline Phosphatase 104 U/L    


 


Troponin I 0.054 ng/ml   0.063 ng/ml  


 


Pro-B-Type Natriuretic Peptide > 70362 pg/ml    


 


Total Protein 7.3 gm/dl    


 


Albumin 3.0 gm/dl    


 


Thyroid Stimulating Hormone


(TSH) 0.787 uIu/ml 


  


  


  


 


 


Urine Color  DK YELLOW   


 


Urine Appearance  CLOUDY   


 


Urine pH  5.5   


 


Urine Specific Gravity  1.019   


 


Urine Protein  3+   


 


Urine Glucose (UA)  NEG   


 


Urine Ketones  NEG   


 


Urine Occult Blood  3+   


 


Urine Nitrite  NEG   


 


Urine Bilirubin  NEG   


 


Urine Urobilinogen  NEG   


 


Urine Leukocyte Esterase  MODERATE   


 


Urine WBC (Auto)  >30 /hpf   


 


Urine RBC (Auto)  >30 /hpf   


 


Urine Hyaline Casts (Auto)  1-5 /lpf   


 


Urine Epithelial Cells (Auto)  5-10 /lpf   


 


Urine Bacteria (Auto)  NEG   


 


Creatine Kinase MB   0.9 ng/ml  


 


Creatine Kinase MB Ratio     


 


Est Creatinine Clear Calc


Drug Dose 


  


  


  46.4 ml/min 


 


 


Estimated Average Glucose    97 mg/dl 


 


Hemoglobin A1c    5.0 % 


 


Triglycerides Level    49 mg/dl 


 


Cholesterol Level    101 mg/dl 


 


HDL Cholesterol    58 mg/dl 


 


LDL Cholesterol, Calculated    33 mg/dl 


 


VLDL Cholesterol, Calculated    10 mg/dl 


 


Cholesterol/HDL Ratio    1.7 


 


Test


  4/11/18


08:26 


  


  


 


 


Creatine Kinase MB 0.6 ng/ml    


 


Creatine Kinase MB Ratio     


 


Troponin I 0.048 ng/ml    








Imaging: Chest x-ray shows cardiomegaly and what appears to be congestive heart 

failure signs





EKG: Atrial fibrillation with left anterior fascicular block and right bundle 

branch block, heart rate controlled





Telemetry reviewed: Atrial fibrillation with a well-controlled heart rate





Echocardiography: His echocardiogram shows normal left ventricular size, mild 

left ventricular hypertrophy, left ventricular dysfunction with wall motion 

abnormalities.  The left ventricular ejection fraction is 35-40%, he has severe 

aortic stenosis and mild to moderate aortic insufficiency as well as mild 

mitral regurgitation.





Assessment & Plan


1.  Aortic stenosis: He has severe aortic stenosis, to the point where he 

likely will need valve replacement.  I do not know how long this has been known

, it evidently was identified in [place] relatively recently, possibly before 

that as we do not have records.  In the past he has refused further evaluation.

  I discussed that with him, he told me that he was not afraid to die and I 

explained to him that it would be a slow process but would get progressively 

worse.  I recommended catheterization if he is interested in further treatment 

which would involve some type of valve replacement.  He is going to think about 

it and we can do the catheterization here if he would like.  His niece was 

present during the discussion and would like him to proceed.





2.  Atrial fibrillation: I do not know how long he has had atrial fibrillation, 

it could be recent or could be long-standing.  His rate is well controlled and 

he is not aware of it.  He is now on an anticoagulant but was not as an 

outpatient.  I agree with the use of heparin now, we should switch him to an 

oral anticoagulant and he should be on long-term anticoagulation.  Rate control 

does not seem to be an issue at rest, we may have to evaluate that with 

exertion.





3.  Cardiomyopathy: He has at least a moderate cardiomyopathy with ejection 

fraction 35-40%.  This may be related to his aortic stenosis, however he has 

wall motion abnormalities and conduction abnormalities.  We should exclude 

ischemic heart disease as a cause which we can identify at catheterization.  

Unless he wants to proceed with further evaluation or intervention I do not 

think there is any role for noninvasive testing.





4.  Right arm weakness and dysarthria: His niece reports that his speech is 

much more difficult to understand over the last 6-12 months and he notes 

difficulty using his arm over that same period of time.  Although he attributes 

this to some type of shoulder injury I am concerned that he could have had a 

stroke.  I am going to get a CT scan to see whether he has an old stroke which 

would be helpful to know.





Thank you for allowing me to participate in his care.

## 2018-04-11 NOTE — HOSPITALIST PROGRESS NOTE
Hospitalist Progress Note


Date of Service


2018.


 (Herminia Taylor ., PA-C)





Subjective


Pt evaluation today including:  conversation w/ patient, conversation w/ family 

(niece at bedside), physical exam, chart review, lab review, review of studies, 

conversation w/ consultant (cardiology), review of inpatient medication list


Pain:  None


PO Intake:  NPO


Voiding:  cabrera catheter in place (chronic)


The patient reports feeling well. He states he has intermittent left sided 

chest pressure, but denies any currently.  He denies any shortness of breath.  

He has had a productive cough recently but this seems improved.  He denies any 

previous cardiac history to his knowledge, but states he has had high blood 

pressure in the past.  He denies any current medications at home except for 

Aleve.  He states he had a history of prediabetes at one point but his A1c is 

normal now.  He also notes a history of colon cancer that was diagnosed in 

.  He had a colon resection at that time and did not require any chemo/

radiation.  He has a h/o chronic urinary retention w/chronic Cabrera.  The 

patient is NPO at the time of my visit and is hungry.  1 day prior to arrival, 

he had poor appetite and ate very little. The patient denies fevers, chills, 

sweats, chest pain, palpitations, claudication, wheezing, shortness of breath, 

nausea, vomiting, abdominal pain, dysuria, hematuria, paralysis, weakness, 

numbness and tingling.





   Additional Comments:


See HPI for pertinent positives and negatives.  All other systems reviewed and 

negative.


 (Herminia Taylor ., PA-C)





Objective


Vital Signs











  Date Time  Temp Pulse Resp B/P (MAP) Pulse Ox O2 Delivery O2 Flow Rate FiO2


 


18 12:00     97 Room Air  


 


18 11:38  75 16  96 Room Air  


 


18 11:27 37.5 64 19 141/91 (108) 96 Room Air  


 


18 08:00     97 Room Air  


 


18 07:48 36.7 57 18 178/81 (113) 97   


 


18 07:09  78 18  96 Room Air  


 


18 04:00      Room Air  


 


18 03:40 36.8 66 20 152/92 (112) 94 Room Air  


 


4/10/18 23:59      Room Air  


 


4/10/18 23:33 36.6 59 18 132/79 (96) 96 Room Air  


 


4/10/18 22:07 37.6 67 20 131/81 96 Room Air  


 


4/10/18 19:59 37.5 74 22 148/96 98   


 


4/10/18 19:24  74 22 148/96 98 Room Air  


 


4/10/18 18:30  76 31 149/100 98 Room Air  


 


4/10/18 17:18  83 29 145/97 94 Room Air  


 


4/10/18 17:07  75      


 


4/10/18 15:50 37.5 93 20 121/79 100 Room Air  








 (Herminia Taylor ., PA-C)





Physical Exam


Notes:


General appearance:  Well-developed, well-nourished, no apparent distress


Head:  Normocephalic, atraumatic


Eyes:  Normal inspection, PERRL, EOMI


ENT:  +Edentulous.  Normal ENT inspection, hearing grossly normal, pharynx 

normal


Neck:  Supple, no JVD, trachea midline


Respiratory/Chest:  +Decreased breath sounds.  Lungs clear to auscultation, no 

respiratory distress


Cardiovascular:  +Irregularly irregular, rate controlled. Systolic murmur. No 

gallop


Abdomen/GI:  Normal bowel sounds, non-tender, soft


Extremities/Musculoskeletal:  Normal inspection, no calf tenderness, no pedal 

edema


Neurological/Psych:  Alert, normal mood/affect, oriented x 3


Skin:  Normal color, warm/dry, no rash


 (Herminia Tayolr ., PA-C)





Laboratory Results





Last 24 Hours








Test


  4/10/18


16:34 4/10/18


17:30 18


00:18 18


03:30


 


White Blood Count 6.41 K/uL    4.87 K/uL 


 


Red Blood Count 4.04 M/uL    3.96 M/uL 


 


Hemoglobin 12.4 g/dL    12.0 g/dL 


 


Hematocrit 37.2 %    36.1 % 


 


Mean Corpuscular Volume 92.1 fL    91.2 fL 


 


Mean Corpuscular Hemoglobin 30.7 pg    30.3 pg 


 


Mean Corpuscular Hemoglobin


Concent 33.3 g/dl 


  


  


  33.2 g/dl 


 


 


Platelet Count 186 K/uL    174 K/uL 


 


Mean Platelet Volume 8.9 fL    8.7 fL 


 


Neutrophils (%) (Auto) 84.2 %    


 


Lymphocytes (%) (Auto) 6.6 %    


 


Monocytes (%) (Auto) 8.0 %    


 


Eosinophils (%) (Auto) 0.5 %    


 


Basophils (%) (Auto) 0.5 %    


 


Neutrophils # (Auto) 5.41 K/uL    


 


Lymphocytes # (Auto) 0.42 K/uL    


 


Monocytes # (Auto) 0.51 K/uL    


 


Eosinophils # (Auto) 0.03 K/uL    


 


Basophils # (Auto) 0.03 K/uL    


 


RDW Standard Deviation 47.3 fL    46.5 fL 


 


RDW Coefficient of Variation 13.9 %    13.8 % 


 


Immature Granulocyte % (Auto) 0.2 %    


 


Immature Granulocyte # (Auto) 0.01 K/uL    


 


Prothrombin Time 12.0 SECONDS    


 


Prothromb Time International


Ratio 1.1 


  


  


  


 


 


Activated Partial


Thromboplast Time 33.8 SECONDS 


  


  


  47.4 SECONDS 


 


 


Partial Thromboplastin Ratio 1.3    1.8 


 


Sodium Level 133 mmol/L    132 mmol/L 


 


Potassium Level 4.2 mmol/L    4.1 mmol/L 


 


Chloride Level 101 mmol/L    101 mmol/L 


 


Carbon Dioxide Level 24 mmol/L    24 mmol/L 


 


Anion Gap 8.0 mmol/L    7.0 mmol/L 


 


Blood Urea Nitrogen 22 mg/dl    22 mg/dl 


 


Creatinine 1.34 mg/dl    1.27 mg/dl 


 


Estimated GFR (


American) 58.0 


  


  


  61.9 


 


 


Estimated GFR (Non-


American 50.0 


  


  


  53.4 


 


 


BUN/Creatinine Ratio 16.1    17.5 


 


Random Glucose 98 mg/dl    84 mg/dl 


 


Calcium Level 8.5 mg/dl    8.7 mg/dl 


 


Magnesium Level 2.0 mg/dl    


 


Total Bilirubin 0.8 mg/dl    


 


Direct Bilirubin 0.3 mg/dl    


 


Aspartate Amino Transf


(AST/SGOT) 18 U/L 


  


  


  


 


 


Alanine Aminotransferase


(ALT/SGPT) 20 U/L 


  


  


  


 


 


Alkaline Phosphatase 104 U/L    


 


Troponin I 0.054 ng/ml   0.063 ng/ml  


 


Pro-B-Type Natriuretic Peptide > 74089 pg/ml    


 


Total Protein 7.3 gm/dl    


 


Albumin 3.0 gm/dl    


 


Thyroid Stimulating Hormone


(TSH) 0.787 uIu/ml 


  


  


  


 


 


Urine Color  DK YELLOW   


 


Urine Appearance  CLOUDY   


 


Urine pH  5.5   


 


Urine Specific Gravity  1.019   


 


Urine Protein  3+   


 


Urine Glucose (UA)  NEG   


 


Urine Ketones  NEG   


 


Urine Occult Blood  3+   


 


Urine Nitrite  NEG   


 


Urine Bilirubin  NEG   


 


Urine Urobilinogen  NEG   


 


Urine Leukocyte Esterase  MODERATE   


 


Urine WBC (Auto)  >30 /hpf   


 


Urine RBC (Auto)  >30 /hpf   


 


Urine Hyaline Casts (Auto)  1-5 /lpf   


 


Urine Epithelial Cells (Auto)  5-10 /lpf   


 


Urine Bacteria (Auto)  NEG   


 


Creatine Kinase MB   0.9 ng/ml  


 


Creatine Kinase MB Ratio     


 


Est Creatinine Clear Calc


Drug Dose 


  


  


  46.4 ml/min 


 


 


Estimated Average Glucose    97 mg/dl 


 


Hemoglobin A1c    5.0 % 


 


Triglycerides Level    49 mg/dl 


 


Cholesterol Level    101 mg/dl 


 


HDL Cholesterol    58 mg/dl 


 


LDL Cholesterol, Calculated    33 mg/dl 


 


VLDL Cholesterol, Calculated    10 mg/dl 


 


Cholesterol/HDL Ratio    1.7 


 


Test


  18


08:26 


  


  


 


 


Creatine Kinase MB 0.6 ng/ml    


 


Creatine Kinase MB Ratio     


 


Troponin I 0.048 ng/ml    








 (Herminia Taylor, FLORA)





Diagnostic Results


Echocardiogram:





Interpretation Summary


  *  Name: BIANKA GOMEZ  Study Date: 2018 07:35 AM  BP: 152/92 mmHg


  *  MRN: [id number]  Patient Location: [facility]\S\38\S\2  HR: 69


  *  : 1940 (M/d/yyyy)  Gender: Male  Height: 66 in


  *  Age: 77 yrs  Ethnicity: [place]  Weight: 164 lb


  *  Ordering Physician: Tameka Beavers


  *  Referring Physician: Self, Referred


  *  Performed By: Deann Damian, [facility]


  *  Accession# [id number]  Account# [id number]


  *  Reason For Study: A-FIB


  *  BSA: 1.8 m2


  *  -- Conclusions --


  *  1. Normal LV size.  Mild concentric LVH.


  *  2. LVEF 35-40%. Mid to apical septum akinetic.  Severe inferior 

hypokinesis.  Moderate inferolateral hypokinesis.


  *  3. Normal RV size and function.


  *  4. Severe calcific aortic stenosis.  Mild to moderate aortic regurgitation.


  *  5. Mild mitral regurgitation.


  *  6. Moderate pulmonary hypertension.  Est PASP 55-60 mmHg.  Normal RA 

pressures.


  *  7. Grade II diastolic dysfunction.


  *  8. No prior studies for comparison.


Procedure Details


  *  A complete two-dimensional transthoracic echocardiogram was performed (2D, 

M-mode, Doppler and color flow Doppler).


Left Ventricle


  *  The left ventricle is grossly normal size.


  *  There is mild concentric left ventricular hypertrophy.


  *  Ejection Fraction = 35-40%.


  *  Mid to apical septum akinetic.  Severe inferior hypokinesis.  Moderate 

inferolateral hypokinesis.


Right Ventricle


  *  The right ventricle is grossly normal size.


  *  The right ventricular systolic function is normal as assessed by tricuspid 

annular plane systolic excursion (TAPSE) (normal >1.5 cm).


Atria


  *  The left atrium is mildly dilated.


  *  Borderline right atrial enlargement.


  *  No ASD detected; PFO is not assessed.


Mitral Valve


  *  Posterior leaflet restricted


  *  There is mild mitral regurgitation.


Tricuspid Valve


  *  There is trace tricuspid regurgitation.


  *  Right ventricular systolic pressure is elevated at 50-60mmHg.


Aortic Valve


  *  Aortic valve calcified, thickned, restricted


  *  Severe valvular aortic stenosis.


  *  Mild to moderate aortic regurgitation.


Pulmonic Valve


  *  The pulmonary valve is inadequately visualized, but the Doppler data is 

adequate for interpretation.


  *  There is no pulmonic valvular stenosis.


  *  Mild pulmonic valvular regurgitation.


Great Vessels


  *  The aortic root and proximal ascending aorta are normal sized.


Pericardium/Pleural


  *  There is no pericardial effusion.


Great Vessels


  *  Normal inferior vena cava size and collapsability with sniff indicates a 

normal right atrial pressure of 3 mmHg


Left Ventricular Diastolic Function


  *  Diastolic dysfunction, Grade II, consistent with elevated left atrial 

pressure.


 (Herminia Taylor ., PA-C)





Assessment and Plan


78 y/o male with a history of HTN, prediabetes, colon cancer s/p resection, and 

chronic urinary retention w/chronic Cabrera who presented to the ED on 4/10 with 

cough and decreased appetite.





Atrial fibrillation, unknown if new onset or previous history--stable, rate 

controlled


-Admit to telemetry.  No acute events overnight.  Pt in rate controlled a-fib 

with HR 50s-70s.


-TSH, potassium, magnesium WNL


-Continue heparin drip for now, consider novel oral agents following cath


-Echo shows EF 35-40%.  Mid to apical septum akinesis.  Severe inferior 

hypokinesis.  Moderate inferolateral hypokinesis.  Severe calcified aortic 

stenosis.  Moderate pulmonary HTN with PASP 55-60 mmHg.  Grade II diastolic 

dysfunction


-Cardiology consulted, appreciate recs:  Spoke to Dr. Goodman.  Unknown 

chronicity of a-fib.  Continue heparin drip for now.  Severe AS, will likely 

need replaced.  Recommend cardiac cath for now.  Will order head CT to assess 

for possible old stroke due to slurred speech and RUE weakness x 6-12 months


-Pt agreeable to cath, NPO after midnight





Chest pain--resolved


-Troponin peaked at 0.063, trending down.  Likely secondary to demand ischemia


-Lipid panel WNL.  Total cholesterol 101, HDL 58, non-HDL 43


-Will d/c empiric Lipitor due to low cholesterol in elderly pt, increased ICH 

risk


-HgbA1c 5.0





Multifocal PNA--stable


-Continue Levaquin 750 mg IV qd, day #2


-DuoNebs QID and PRN for SOB/wheezing 


-BCx pending, sputum cx if able to obtain


-MRSA swab negative





HTN--no meds at home per pt


-Hydralazine 10 mg IV q6h prn SBP >180





Mild hyponatremia--stable, asymptomatic


-Continue to monitor





H/o colon cancer s/p resection--noted





Urinary retention w/ indwelling Cabrera--noted





DVT prophylaxis


-Heparin drip





Code Status


-Level V, DO NOT RESUSCITATE





Dispo


-From [place], in town visiting [place].  Had lived alone


-Planning for [facility]


 (Herminia Taylor ., FLORA)








===================================================================





I personally interviewed and examined the patient.


I agree with history of present illness and physical exam mentioned above, I 

also performed my own history taking and examination.


Past medical history and review of system has been obtained by myself


I reviewed all pertinent labs and studies


Reviewed current medications


I discussed and formulated of the assessment and plan mentioned above.


Please refer to the Summary mentioned below.





77-year-old man with history of colon cancer status post resection, chronic 

urinary retention with chronic indwelling Cabrera catheter and hypertension 

presented to the ED with cough, chest pain and shortness of breath.  He was 

found to have atrial fibrillation patient does not know if it is new or old, 2D 

echo showed ejection fraction of 35-40% with hypokinesia and aortic stenosis , 

troponin was borderline elevated, patient had chest pain and imaging studies 

showed multifocal pneumonia he was started on Levaquin IV and bronchodilators.  

Also started on heparin drip by cardiologist, cardiology consult appreciated 

and recommended cardiac catheterization when stable.





General Appearance: not in acute distress


Eyes: normal Sclerae,  extraocular muscle intact


ENT:  hearing grossly normal


Neck:  supple


Respiratory/Chest: normal air entry  bilateral ,no respiratory distress, no 

accessory muscle use


Cardiovascular:   irregular irregularity with pansystolic murmur


Abdomen:   non tender, soft, no masses


Extremities:  no edema


musculoskeletal: no significant swelling or inflammation in any joint


Neurologic/Psychiatric: Awake alert oriented times place and person moves all 

extremities sensation intact cranial nerves II-12 appear to be intact


Skin:  normal color, warm/dry, no rash





Stanislav Chen MD, 


[facility] hospitalist group


 (Stanislav Kumar MD)

## 2018-04-11 NOTE — ECHOCARDIOGRAM REPORT
*NOTICE TO RECEIVING PARTY AGENCY**  This information is strictly Confidential and protected under 
[place] law.  [place] law prohibits you from making any further disclosure of this 
information unless further disclosure is expressly permitted by the written consent of the person to 
whom it pertains or is authorized by law.  A general authorization for the release of medical or 
other information is not sufficient for this purpose.  Hospital accepts no responsibility if the 
information is made available to any other person, INCLUDING THE PATIENT.



Interpretation Summary

  *  Name: BIANKA GOMEZ  Study Date: 2018 07:35 AM  BP: 152/92 mmHg

  *  MRN: [id number]  Patient Location: C.2T\S\S238\S\2  HR: 69

  *  : 1940 (M/d/yyyy)  Gender: Male  Height: 66 in

  *  Age: 77 yrs  Ethnicity: [place]  Weight: 164 lb

  *  Ordering Physician: Tameka Beavers

  *  Referring Physician: Self, Referred

  *  Performed By: Deann Damian, [facility]

  *  Accession# [id number]  Account# [id number]

  *  Reason For Study: A-FIB

  *  BSA: 1.8 m2

  *  -- Conclusions --

  *  1. Normal LV size.  Mild concentric LVH.

  *  2. LVEF 35-40%. Mid to apical septum akinetic.  Severe inferior hypokinesis.  Moderate 
inferolateral hypokinesis.

  *  3. Normal RV size and function.

  *  4. Severe calcific aortic stenosis.  Mild to moderate aortic regurgitation.

  *  5. Mild mitral regurgitation.

  *  6. Moderate pulmonary hypertension.  Est PASP 55-60 mmHg.  Normal RA pressures.

  *  7. Grade II diastolic dysfunction.

  *  8. No prior studies for comparison.

Procedure Details

  *  A complete two-dimensional transthoracic echocardiogram was performed (2D, M-mode, Doppler and 
color flow Doppler).

Left Ventricle

  *  The left ventricle is grossly normal size.

  *  There is mild concentric left ventricular hypertrophy.

  *  Ejection Fraction = 35-40%.

  *  Mid to apical septum akinetic.  Severe inferior hypokinesis.  Moderate inferolateral 
hypokinesis.

Right Ventricle

  *  The right ventricle is grossly normal size.

  *  The right ventricular systolic function is normal as assessed by tricuspid annular plane 
systolic excursion (TAPSE) (normal >1.5 cm).

Atria

  *  The left atrium is mildly dilated.

  *  Borderline right atrial enlargement.

  *  No ASD detected; PFO is not assessed.

Mitral Valve

  *  Posterior leaflet restricted

  *  There is mild mitral regurgitation.

Tricuspid Valve

  *  There is trace tricuspid regurgitation.

  *  Right ventricular systolic pressure is elevated at 50-60mmHg.

Aortic Valve

  *  Aortic valve calcified, thickned, restricted

  *  Severe valvular aortic stenosis.

  *  Mild to moderate aortic regurgitation.

Pulmonic Valve

  *  The pulmonary valve is inadequately visualized, but the Doppler data is adequate for 
interpretation.

  *  There is no pulmonic valvular stenosis.

  *  Mild pulmonic valvular regurgitation.

Great Vessels

  *  The aortic root and proximal ascending aorta are normal sized.

Pericardium/Pleural

  *  There is no pericardial effusion.

Great Vessels

  *  Normal inferior vena cava size and collapsability with sniff indicates a normal right atrial 
pressure of 3 mmHg

Left Ventricular Diastolic Function

  *  Diastolic dysfunction, Grade II, consistent with elevated left atrial pressure.



MMode 2D Measurements and Calculations

IVSd 1.5 cm

IVSs 1.6 cm



LVIDd 5.5 cm

LVIDs 4.1 cm

LVPWd 1.3 cm

LVPWs 1.4 cm



IVS/LVPW 1.2 

FS 25.7 %

EDV(Teich) 146.7 ml

ESV(Teich) 73.2 ml

EF(Teich) 50.1 %



EDV(cubed) 165.4 ml

ESV(cubed) 67.8 ml

EF(cubed) 59.0 %

% IVS thick 7.3 %

% LVPW thick 8.1 %





LV mass(C)d 347.0 grams

LV mass(C)dI 188.8 grams/m\S\2

LV mass(C)s 248.9 grams

LV mass(C)sI 135.4 grams/m\S\2



SV(Teich) 73.5 ml

SI(Teich) 40.0 ml/m\S\2

SV(cubed) 97.6 ml

SI(cubed) 53.1 ml/m\S\2



LA dimension 4.9 cm



LVOT diam 1.8 cm

LVOT area 2.6 cm\S\2





LVAd ap4 21.6 cm\S\2

LVLd ap4 6.9 cm

EDV(MOD-sp4) 56.2 ml

EDV(sp4-el) 57.4 ml

LVAs ap4 33.3 cm\S\2

LVLs ap4 9.9 cm

ESV(MOD-sp4) 89.6 ml

ESV(sp4-el) 95.3 ml

EF(MOD-sp4) -59.59 %

EF(sp4-el) -66.03 %



SV(MOD-sp4) -33.46 ml

SI(MOD-sp4) -18.20 ml/m\S\2



SV(sp4-el) -37.89 ml

SI(sp4-el) -20.61 ml/m\S\2









Doppler Measurements and Calculations

MV E max alberta 131.2 cm/sec



MV P1/2t max alberta 122.5 cm/sec

MV P1/2t 76.7 msec

MVA(P1/2t) 2.9 cm\S\2

MV dec slope 467.6 cm/sec\S\2

MV dec time 0.15 sec



Ao V2 max 431.4 cm/sec

Ao max PG 74.5 mmHg

Ao max PG (full) 71.4 mmHg

Ao V2 mean 328.4 cm/sec

Ao mean PG 49.3 mmHg

Ao mean PG (full) 47.6 mmHg

Ao V2 .9 cm

MAXWELL(I,A) 0.47 cm\S\2

MAXWELL(I,D) 0.47 cm\S\2

MAXWELL(V,A) 0.53 cm\S\2

MAXWELL(V,D) 0.53 cm\S\2



AI max alberta 415.9 cm/sec

AI max PG 69.2 mmHg

AI dec slope 278.7 cm/sec\S\2

AI P1/2t 437.0 msec





LV V1 max PG 3.0 mmHg

LV V1 mean PG 1.7 mmHg



LV V1 max 86.9 cm/sec

LV V1 mean 60.6 cm/sec

LV V1 VTI 18.5 cm



SV(LVOT) 48.8 ml

SI(LVOT) 26.6 ml/m\S\2



PA V2 max 181.2 cm/sec

PA max PG 13.1 mmHg





PI max alberta 291.6 cm/sec

PI max PG 34.0 mmHg

PI dec slope 183.4 cm/sec\S\2

PI P1/2t 465.7 msec



TR max alberta 326.0 cm/sec

## 2018-04-12 VITALS — HEART RATE: 64 BPM | SYSTOLIC BLOOD PRESSURE: 170 MMHG | OXYGEN SATURATION: 97 % | DIASTOLIC BLOOD PRESSURE: 83 MMHG

## 2018-04-12 VITALS
SYSTOLIC BLOOD PRESSURE: 162 MMHG | TEMPERATURE: 97.52 F | OXYGEN SATURATION: 96 % | HEART RATE: 69 BPM | DIASTOLIC BLOOD PRESSURE: 91 MMHG

## 2018-04-12 VITALS — DIASTOLIC BLOOD PRESSURE: 87 MMHG | SYSTOLIC BLOOD PRESSURE: 164 MMHG | OXYGEN SATURATION: 95 % | HEART RATE: 73 BPM

## 2018-04-12 VITALS — DIASTOLIC BLOOD PRESSURE: 81 MMHG | OXYGEN SATURATION: 98 % | HEART RATE: 62 BPM | SYSTOLIC BLOOD PRESSURE: 162 MMHG

## 2018-04-12 VITALS — OXYGEN SATURATION: 96 % | HEART RATE: 66 BPM

## 2018-04-12 VITALS
OXYGEN SATURATION: 99 % | SYSTOLIC BLOOD PRESSURE: 153 MMHG | HEART RATE: 63 BPM | DIASTOLIC BLOOD PRESSURE: 82 MMHG | TEMPERATURE: 98.24 F

## 2018-04-12 VITALS — OXYGEN SATURATION: 96 % | HEART RATE: 67 BPM

## 2018-04-12 VITALS
TEMPERATURE: 98.42 F | DIASTOLIC BLOOD PRESSURE: 68 MMHG | OXYGEN SATURATION: 94 % | HEART RATE: 63 BPM | SYSTOLIC BLOOD PRESSURE: 107 MMHG

## 2018-04-12 VITALS
DIASTOLIC BLOOD PRESSURE: 84 MMHG | HEART RATE: 65 BPM | SYSTOLIC BLOOD PRESSURE: 144 MMHG | OXYGEN SATURATION: 96 % | TEMPERATURE: 97.88 F

## 2018-04-12 VITALS — HEART RATE: 78 BPM | OXYGEN SATURATION: 98 %

## 2018-04-12 VITALS
TEMPERATURE: 99.14 F | SYSTOLIC BLOOD PRESSURE: 165 MMHG | OXYGEN SATURATION: 96 % | DIASTOLIC BLOOD PRESSURE: 88 MMHG | HEART RATE: 66 BPM

## 2018-04-12 VITALS — OXYGEN SATURATION: 97 % | DIASTOLIC BLOOD PRESSURE: 84 MMHG | SYSTOLIC BLOOD PRESSURE: 169 MMHG | HEART RATE: 69 BPM

## 2018-04-12 VITALS
HEART RATE: 69 BPM | OXYGEN SATURATION: 97 % | DIASTOLIC BLOOD PRESSURE: 107 MMHG | SYSTOLIC BLOOD PRESSURE: 173 MMHG | TEMPERATURE: 98.06 F

## 2018-04-12 VITALS — OXYGEN SATURATION: 97 %

## 2018-04-12 VITALS — OXYGEN SATURATION: 96 %

## 2018-04-12 VITALS — HEART RATE: 68 BPM | SYSTOLIC BLOOD PRESSURE: 170 MMHG | DIASTOLIC BLOOD PRESSURE: 81 MMHG | OXYGEN SATURATION: 98 %

## 2018-04-12 VITALS — DIASTOLIC BLOOD PRESSURE: 85 MMHG | SYSTOLIC BLOOD PRESSURE: 158 MMHG | OXYGEN SATURATION: 97 % | HEART RATE: 67 BPM

## 2018-04-12 VITALS
SYSTOLIC BLOOD PRESSURE: 165 MMHG | TEMPERATURE: 99.14 F | HEART RATE: 66 BPM | OXYGEN SATURATION: 96 % | DIASTOLIC BLOOD PRESSURE: 88 MMHG

## 2018-04-12 VITALS — DIASTOLIC BLOOD PRESSURE: 89 MMHG | SYSTOLIC BLOOD PRESSURE: 162 MMHG | OXYGEN SATURATION: 100 % | HEART RATE: 64 BPM

## 2018-04-12 LAB
BUN SERPL-MCNC: 21 MG/DL (ref 7–18)
CALCIUM SERPL-MCNC: 8.5 MG/DL (ref 8.5–10.1)
CO2 SERPL-SCNC: 24 MMOL/L (ref 21–32)
CREAT SERPL-MCNC: 1.24 MG/DL (ref 0.6–1.4)
EOSINOPHIL NFR BLD AUTO: 163 K/UL (ref 130–400)
GLUCOSE SERPL-MCNC: 88 MG/DL (ref 70–99)
HCT VFR BLD CALC: 35.4 % (ref 42–52)
HGB BLD-MCNC: 11.8 G/DL (ref 14–18)
MCH RBC QN AUTO: 30.2 PG (ref 25–34)
MCHC RBC AUTO-ENTMCNC: 33.3 G/DL (ref 32–36)
MCV RBC AUTO: 90.5 FL (ref 80–100)
PMV BLD AUTO: 8.5 FL (ref 7.4–10.4)
POTASSIUM SERPL-SCNC: 3.6 MMOL/L (ref 3.5–5.1)
PTT PATIENT: 50 SECONDS (ref 21–31)
RED CELL DISTRIBUTION WIDTH CV: 13.8 % (ref 11.5–14.5)
RED CELL DISTRIBUTION WIDTH SD: 45.8 FL (ref 36.4–46.3)
SODIUM SERPL-SCNC: 131 MMOL/L (ref 136–145)
WBC # BLD AUTO: 3.49 K/UL (ref 4.8–10.8)

## 2018-04-12 PROCEDURE — 4A023N8 MEASUREMENT OF CARDIAC SAMPLING AND PRESSURE, BILATERAL, PERCUTANEOUS APPROACH: ICD-10-PCS | Performed by: INTERNAL MEDICINE

## 2018-04-12 PROCEDURE — B211YZZ FLUOROSCOPY OF MULTIPLE CORONARY ARTERIES USING OTHER CONTRAST: ICD-10-PCS | Performed by: INTERNAL MEDICINE

## 2018-04-12 RX ADMIN — IPRATROPIUM BROMIDE AND ALBUTEROL SULFATE SCH ML: .5; 3 SOLUTION RESPIRATORY (INHALATION) at 19:14

## 2018-04-12 RX ADMIN — ACETYLCYSTEINE SCH MG: 100 INHALANT RESPIRATORY (INHALATION) at 20:30

## 2018-04-12 RX ADMIN — ACETYLCYSTEINE SCH MG: 100 INHALANT RESPIRATORY (INHALATION) at 08:08

## 2018-04-12 RX ADMIN — Medication SCH MG: at 08:07

## 2018-04-12 RX ADMIN — IPRATROPIUM BROMIDE AND ALBUTEROL SULFATE SCH ML: .5; 3 SOLUTION RESPIRATORY (INHALATION) at 11:22

## 2018-04-12 RX ADMIN — IPRATROPIUM BROMIDE AND ALBUTEROL SULFATE SCH ML: .5; 3 SOLUTION RESPIRATORY (INHALATION) at 07:00

## 2018-04-12 RX ADMIN — IPRATROPIUM BROMIDE AND ALBUTEROL SULFATE SCH ML: .5; 3 SOLUTION RESPIRATORY (INHALATION) at 15:39

## 2018-04-12 NOTE — CLINICAL DOCUMENTATION QUERY
**** CLINICAL DOCUMENTATION QUERY****





Dr. LAURA HERNANDEZ, 



In your clinical opinion is this patient being managed for:

    

                        (  ) Acute combined systolic and diastolic CHF

                        (x  ) Not Agree



                        (  ) Other explanation of clinical findings (Please Explain)

                        (  ) Unable to determine (Please Define)

                        (  ) Need to Discuss

                        



The medical record reflects the following clinical findings, treatment, and risk factors.  
  



Clinical Indicators: 78 yo male presenting with a cough productive for white milky sputum. 
ER note indicates pt with decreased breath sounds and wheezing throughout and +1 pitting 
edema BLE. ProBNP >99323. CXR: multifocal bilateral airspace opacities. Is present 
pulmonary edema and/or multifocal pneumonia. H/P possible CHF. ECHO with EF 35-40% with 
grade II diastolic dysfunction.

Treatment: ECHO, cardiology consult, IV metoprolol prn, cardiac enzymes, ECHO, IV heparin, 
tele monitoring, IV lasix x 1 dose, I/O, daily wts

Risk Factors: age, A fib ? new onset, pneumonia, aortic stenosis, cardiomyopathy, hx CVA, 
HTN



Please clarify and document your clinical opinion in the progress notes and discharge 
summary. Terms such as "probable", "suspected", "likely", "questionable", "possible", or 
"still to be ruled out" are acceptable. 



*****IF IN AGREEMENT, YOU MUST DOCUMENT ABOVE DIAGNOSTIC STATEMENT IN DAILY PROGRESS NOTES 
AND DISCHARGE SUMMARY. This document is not part of the patient's record.*****

Thank You, Lydia Horan, [phone]

## 2018-04-12 NOTE — PRE SEDATION ASSESSMENT
Pre Sedation Assessment


General


Date of Sedation:


Apr 12, 2018.





Vital Signs Past 12 Hours








  Date Time  Temp Pulse Resp B/P (MAP) Pulse Ox O2 Delivery O2 Flow Rate FiO2


 


4/12/18 12:00     96 Room Air  


 


4/12/18 11:24 36.8 63 16 153/82 (105) 99 Room Air  


 


4/12/18 11:23  78 18  98 Room Air  


 


4/12/18 08:00     96 Room Air  


 


4/12/18 07:21 36.6 65 20 144/84 (104) 96 Room Air  


 


4/12/18 07:02  67 18  96 Room Air  


 


4/12/18 04:00      Room Air  


 


4/12/18 03:17 36.9 63 18 107/68 (81) 94 Room Air  











Review


Cardiovascular:  + systolic murmur, + irregularly irregular


Lungs:  chest non-tender, lungs clear





Pre-Sedation Airway Assessment


Smoking Status:  Former Smoker


Hx of Sleep Apnea:  No


Hx of difficult intubation:  No


Short Thick Neck:  No


Thyro-mental Distance:  > 3 Finger Breadths


Oral Cavity:  WNL


Mallampati Classification:  Class III


ASA Classification:  Class III





Procedure Planning


Contraindications for Sedation:  None


Current Medications Reviewed:  Yes





Notes








The planned sedation has been discussed with the patient. Informed Consent was 

obtained.  I have identified the patient, determined the appropriateness of 

sedation and have assessed the patient immediately prior to the procedure.  





All medicine(s) and interventions are by my order.

## 2018-04-12 NOTE — [facility] POST OPERATIVE BRIEF NOTE
Preliminary Procedure Note


Procedure Date


Apr 12, 2018.





Pre-Procedure Diagnosis


Valvular Disease, Cardiomyopathy





AUC Score


7





Post-Procedure Diagnosis


Severe CAD





Procedure(s) Performed


Coronary Angiography





Cardiologist


Aung





Assistant(s)


Merry





Estimated Blood Loss


15





Medication(s)


Fentanyl, Heparin, Versed, Lidocaine 1%





Preliminary Findings


Severe disease in dominant distal circumflex (in-stent restenosis), otherwise 

minimal disease





Recommendations


Medical therapy and/or Counseling





Specimens


None





Anesthesia


Moderate





Procedural Complication(s)


None





Disposition


PCU

## 2018-04-12 NOTE — CLINICAL DOCUMENTATION QUERY
**** CLINICAL DOCUMENTATION QUERY****





Ms. BARRIENTOS, 



In your clinical opinion is this patient being managed for:

    

                        (  ) Acute combined systolic and diastolic CHF

                        ( x ) Not Agree.  Not an acute exacerbation, has not received any 
treatment since admission and is maintaining negative fluid balance.



                        (  ) Other explanation of clinical findings (Please Explain)

                        (  ) Unable to determine (Please Define)

                        (  ) Need to Discuss

                        



The medical record reflects the following clinical findings, treatment, and risk factors.  
  



Clinical Indicators: 78 yo male presenting with a cough productive for white milky sputum. 
ER note indicates pt with decreased breath sounds and wheezing throughout and +1 pitting 
edema BLE. ProBNP >82173. CXR: multifocal bilateral airspace opacities. Is present 
pulmonary edema and/or multifocal pneumonia. H/P possible CHF. ECHO with EF 35-40% with 
grade II diastolic dysfunction.

Treatment: ECHO, cardiology consult, IV metoprolol prn, cardiac enzymes, ECHO, IV heparin, 
tele monitoring, IV lasix x 1 dose, daily wts, I/O

Risk Factors: age, A fib ? new onset, pneumonia, aortic stenosis, cardiomyopathy, hx CVA, 
HTN



Please clarify and document your clinical opinion in the progress notes and discharge 
summary. Terms such as "probable", "suspected", "likely", "questionable", "possible", or 
"still to be ruled out" are acceptable. 



*****IF IN AGREEMENT, YOU MUST DOCUMENT ABOVE DIAGNOSTIC STATEMENT IN DAILY PROGRESS NOTES 
AND DISCHARGE SUMMARY. This document is not part of the patient's record.*****



Thank You, Lydia Horan -[phone]

## 2018-04-12 NOTE — HOSPITALIST PROGRESS NOTE
Hospitalist Progress Note


Date of Service


Apr 12, 2018.


 (Herminia Taylor .FOLRA)





Subjective


Pt evaluation today including:  conversation w/ patient, conversation w/ family 

(at bedside), physical exam, chart review, lab review, review of inpatient 

medication list


Pain:  None


PO Intake:  NPO


Voiding:  cabrera catheter in place (chronic)


The patient reports feeling well.  His main complaint currently is that he is 

hungry as he is NPO for cath today.  He denies any chest pressure or pain at 

this time and denies shortness of breath.  He does still report a productive 

cough.  The patient denies fevers, chills, sweats, chest pain, palpitations, 

claudication, wheezing, shortness of breath, nausea, vomiting, abdominal pain, 

dysuria, hematuria, urinary retention, paralysis, weakness, numbness and 

tingling.





   Additional Comments:


See HPI for pertinent positives and negatives.  All other systems reviewed and 

negative.


 (Herminia Taylor ., FLORA)





Objective


Vital Signs











  Date Time  Temp Pulse Resp B/P (MAP) Pulse Ox O2 Delivery O2 Flow Rate FiO2


 


4/12/18 07:21 36.6 65 20 144/84 (104) 96 Room Air  


 


4/12/18 07:02  67 18  96 Room Air  


 


4/12/18 04:00      Room Air  


 


4/12/18 03:17 36.9 63 18 107/68 (81) 94 Room Air  


 


4/11/18 23:59      Room Air  


 


4/11/18 23:56 37.1 57 20 136/74 (94) 96 Room Air  


 


4/11/18 20:22 37.6 65 18 147/87 (107) 98 Room Air  


 


4/11/18 20:00     98 Room Air  


 


4/11/18 19:04  76 16  98 Room Air  


 


4/11/18 16:10 36.8 67 20 170/97 (121) 99 Room Air  


 


4/11/18 16:00     99 Room Air  


 


4/11/18 12:00     97 Room Air  


 


4/11/18 11:38  75 16  96 Room Air  


 


4/11/18 11:27 37.5 64 19 141/91 (108) 96 Room Air  








 (Herminia Taylor PA-C)





Physical Exam


Notes:


General appearance:  Well-developed, well-nourished, no apparent distress


Head:  Normocephalic, atraumatic


Eyes:  Normal inspection, PERRL, EOMI


ENT:  +Edentulous.  Normal ENT inspection, hearing grossly normal, pharynx 

normal


Neck:  Supple, no JVD, trachea midline


Respiratory/Chest:  +Decreased breath sounds.  Lungs clear to auscultation, no 

respiratory distress


Cardiovascular:  +Irregularly irregular, rate controlled. Systolic murmur. No 

gallop


Abdomen/GI:  Normal bowel sounds, non-tender, soft


Extremities/Musculoskeletal:  Normal inspection, no calf tenderness, no pedal 

edema


Neurological/Psych:  Alert, normal mood/affect, oriented x 3


Skin:  Normal color, warm/dry, no rash


 (Herminia Taylor ., PA-C)





Laboratory Results





Last 24 Hours








Test


  4/12/18


06:31


 


White Blood Count 3.49 K/uL 


 


Red Blood Count 3.91 M/uL 


 


Hemoglobin 11.8 g/dL 


 


Hematocrit 35.4 % 


 


Mean Corpuscular Volume 90.5 fL 


 


Mean Corpuscular Hemoglobin 30.2 pg 


 


Mean Corpuscular Hemoglobin


Concent 33.3 g/dl 


 


 


RDW Standard Deviation 45.8 fL 


 


RDW Coefficient of Variation 13.8 % 


 


Platelet Count 163 K/uL 


 


Mean Platelet Volume 8.5 fL 


 


Activated Partial


Thromboplast Time 50.0 SECONDS 


 


 


Partial Thromboplastin Ratio 1.9 


 


Sodium Level 131 mmol/L 


 


Potassium Level 3.6 mmol/L 


 


Chloride Level 100 mmol/L 


 


Carbon Dioxide Level 24 mmol/L 


 


Anion Gap 7.0 mmol/L 


 


Blood Urea Nitrogen 21 mg/dl 


 


Creatinine 1.24 mg/dl 


 


Est Creatinine Clear Calc


Drug Dose 43.6 ml/min 


 


 


Estimated GFR (


American) 63.7 


 


 


Estimated GFR (Non-


American 54.9 


 


 


BUN/Creatinine Ratio 16.6 


 


Random Glucose 88 mg/dl 


 


Calcium Level 8.5 mg/dl 








 (Herminia Taylor ., PA-C)





Diagnostic Results


Reviewed EKG and agree with interpretation as follows:





65 bpm, atrial fibrillation, RBBB, left anterior fascicular block


 (Herminia Taylor ., PA-C)





Assessment and Plan


76 y/o male with a history of HTN, prediabetes, colon cancer s/p resection, and 

chronic urinary retention w/chronic Cabrera who presented to the ED on 4/10 with 

cough and decreased appetite.





Atrial fibrillation, unknown if new onset or previous history--stable, rate 

controlled


-Admit to telemetry.  No acute events overnight.  Pt in rate controlled a-fib 

with HR 50s-760s.


-TSH, potassium, magnesium WNL


-Continue heparin drip for now, consider novel oral agents following cath


-Echo shows EF 35-40%.  Mid to apical septum akinesis.  Severe inferior 

hypokinesis.  Moderate inferolateral hypokinesis.  Severe calcified aortic 

stenosis.  Moderate pulmonary HTN with PASP 55-60 mmHg.  Grade II diastolic 

dysfunction


-Cardiology consulted, appreciate recs:  Spoke to Dr. Goodman.  Unknown 

chronicity of a-fib.  Continue heparin drip for now.  Severe AS, will likely 

need replaced.  Recommend cardiac cath for now.  Will order head CT to assess 

for possible old stroke due to slurred speech and RUE weakness x 6-12 months


-NPO, cardiac cath today





Chest pain--resolved


-Troponin peaked at 0.063, trending down.  Likely secondary to demand ischemia


-Lipid panel WNL.  Total cholesterol 101, HDL 58, non-HDL 43


-Will d/c empiric Lipitor due to low cholesterol in elderly pt, increased ICH 

risk


-HgbA1c 5.0





Combined systolic/diastolic CHF--stable


-Negative fluid balance here





Multifocal PNA--stable


-Continue Levaquin 750 mg IV q48h (renal dosing), day #3


-DuoNebs QID and PRN for SOB/wheezing 


-BCx NGTD, sputum culture pending


-MRSA swab negative





HTN--no meds at home per pt


-Hydralazine 10 mg IV q6h prn SBP >180





Mild hyponatremia--stable, asymptomatic


-Continue to monitor





H/o colon cancer s/p resection--noted





Urinary retention w/ indwelling Cabrera--noted





DVT prophylaxis


-Heparin drip





Code Status


-Level V, DO NOT RESUSCITATE





Dispo


-From [place], in town visiting [place].  Had lived alone


-PT eval pending, OT recommends inpt rehab


-Planning for [facility], case management following


 (Herminia Taylor ., PAMariolaC)





===================================================================





I personally interviewed and examined the patient.


I agree with history of present illness and physical exam mentioned above, I 

also performed my own history taking and examination.


Past medical history and review of system has been obtained by myself


I reviewed all pertinent labs and studies


Reviewed current medications


I discussed and formulated of the assessment and plan mentioned above.


Please refer to the Summary mentioned below.





77-year-old man with history of colon cancer status post resection, chronic 

urinary retention with chronic indwelling Cabrera catheter and hypertension 

presented to the ED with cough, chest pain and shortness of breath.  He was 

found to have atrial fibrillation patient does not know if it is new or old, 2D 

echo showed ejection fraction of 35-40% with hypokinesia and aortic stenosis , 

troponin was borderline elevated, patient had chest pain and imaging studies 

showed multifocal pneumonia he was started on Levaquin IV and bronchodilators.  

Also started on heparin drip by cardiologist, cardiology consult appreciated 

and recommended cardiac catheterization, currently patient is n.p.o. for 

cardiac catheterization.


Feeling much better.





General Appearance: not in acute distress


Eyes: normal Sclerae,  extraocular muscle intact


ENT:  hearing grossly normal


Neck:  supple


Respiratory/Chest: normal air entry  bilateral ,no respiratory distress, no 

accessory muscle use


Cardiovascular:   irregular irregularity with pansystolic murmur


Abdomen:   non tender, soft, no masses


Extremities:  no edema


musculoskeletal: no significant swelling or inflammation in any joint


Neurologic/Psychiatric: Awake alert oriented times place and person moves all 

extremities sensation intact cranial nerves II-12 appear to be intact


Skin:  normal color, warm/dry, no rash





Stanislav Chen MD, 


[facility] hospitalist group


 (Stanislav Kumar MD)

## 2018-04-12 NOTE — CARDIAC CATHETERIZATION
Procedure Note


Procedure Date


Apr 12, 2018.





Pre-Procedure Diagnosis


Valvular Disease, Cardiomyopathy





AUC Score


7





Post-Procedure Diagnosis


Severe CAD, Normal Intracardiac Pressures





Procedure(s) Performed


Coronary Angiography, Left Heart Cath, Right Heart Cath, Ultrasound Guided 

Vascular Access





Cardiologist


Aung





Assistant(s)


Merry





Estimated Blood Loss


15





Medication(s)


Fentanyl, Heparin, Nicardipine, Nitroglycerin, Versed, Lidocaine 1%





Summary of Findings


Indication:  New cardiomyopathy, severe aortic stenosis





Access:  5 FR right radial artery


Catheters:  Tiger





Findings:


LM - large caliber vessel, angiographically normal


LAD - large caliber vessel, 20-30% proximal disease, mid segment luminal 

regularities, distal vessel wraps around the apex with widely patent stent with 

minimal InStent restenoses.  Gives off 3 small to moderate caliber diagonals.  

High 1st diagonal with 60-70% proximal disease


Circumflex - large caliber vessel, dominant, 20-30% mid segment disease.  

Distal segment 99% InStent restenoses prior to takeoff of left PDA which 

partially fills with ARTEMIO 1 antegrade flow.  Circumflex gives off 2 small to 

moderate caliber obtuse marginals with mild diffuse disease.  Left PLB with 

mild disease.


RCA - non dominant, small, luminal irregularities





Arterial Closure:  TR band





Summary:


1. Severe single-vessel coronary artery disease


 -  subtotal in-stent occlusion of dominant, distal circumflex prior to left PDA


 -  widely patent distal LAD stent





Recommendations:


Nonischemic etiology for patient's severe global cardiomyopathy.  Refer to 

tertiary center for evaluation of aortic valve replacement.


Continued ASCVD risk factor modification





Hemodynamics


Rest Ao:


133/77/99


Final Ao:


161/71/103


LV:


--





Recommendations


valve replacement





Specimens


None





Radiation Exposure (mGy)


1102





Contrast (mls)


30





Fluids (cc crystalloids)


50





Drains


None





Anesthesia


Moderate





Procedural Complication(s)


None





Disposition


PCU





ACC Data


Cardiac Status


Clinical evaluation leading to the procedure


CAD Presntation:  Sx unlikely to be ischemic


Anginal Classification:  CCS I


Heart Failure:  Yes, NYHA Class: CCS II


Cardiogenic Shock w/in 24Hrs:  No


Cardiac Arrest w/in 24Hrs:  No


Imaging studies past 6 months:  Yes


Stress studies past 6 months:  No





Closure Device


Percutaneous Entry Location:  Radial


Closure Device:  Radial Band


Recommendations:  valve replacement





Intraprocedure Events


Significant Dissection:  No


Perforation:  No

## 2018-04-13 VITALS
SYSTOLIC BLOOD PRESSURE: 130 MMHG | DIASTOLIC BLOOD PRESSURE: 68 MMHG | TEMPERATURE: 98.24 F | HEART RATE: 78 BPM | OXYGEN SATURATION: 96 %

## 2018-04-13 VITALS — DIASTOLIC BLOOD PRESSURE: 70 MMHG | HEART RATE: 54 BPM | SYSTOLIC BLOOD PRESSURE: 131 MMHG

## 2018-04-13 VITALS
HEART RATE: 59 BPM | TEMPERATURE: 98.6 F | OXYGEN SATURATION: 98 % | SYSTOLIC BLOOD PRESSURE: 145 MMHG | DIASTOLIC BLOOD PRESSURE: 85 MMHG

## 2018-04-13 VITALS
OXYGEN SATURATION: 99 % | HEART RATE: 67 BPM | SYSTOLIC BLOOD PRESSURE: 185 MMHG | DIASTOLIC BLOOD PRESSURE: 91 MMHG | TEMPERATURE: 97.7 F

## 2018-04-13 VITALS — OXYGEN SATURATION: 97 % | HEART RATE: 56 BPM

## 2018-04-13 VITALS — HEART RATE: 59 BPM | OXYGEN SATURATION: 99 %

## 2018-04-13 VITALS
TEMPERATURE: 97.7 F | DIASTOLIC BLOOD PRESSURE: 83 MMHG | HEART RATE: 58 BPM | OXYGEN SATURATION: 97 % | SYSTOLIC BLOOD PRESSURE: 142 MMHG

## 2018-04-13 VITALS — OXYGEN SATURATION: 97 % | HEART RATE: 81 BPM

## 2018-04-13 VITALS — OXYGEN SATURATION: 99 %

## 2018-04-13 VITALS
DIASTOLIC BLOOD PRESSURE: 82 MMHG | SYSTOLIC BLOOD PRESSURE: 161 MMHG | TEMPERATURE: 97.7 F | HEART RATE: 60 BPM | OXYGEN SATURATION: 98 %

## 2018-04-13 VITALS
DIASTOLIC BLOOD PRESSURE: 89 MMHG | OXYGEN SATURATION: 96 % | TEMPERATURE: 98.96 F | HEART RATE: 62 BPM | SYSTOLIC BLOOD PRESSURE: 160 MMHG

## 2018-04-13 LAB
BUN SERPL-MCNC: 21 MG/DL (ref 7–18)
CALCIUM SERPL-MCNC: 8.3 MG/DL (ref 8.5–10.1)
CO2 SERPL-SCNC: 22 MMOL/L (ref 21–32)
CREAT SERPL-MCNC: 1.18 MG/DL (ref 0.6–1.4)
EOSINOPHIL NFR BLD AUTO: 167 K/UL (ref 130–400)
GLUCOSE SERPL-MCNC: 86 MG/DL (ref 70–99)
HCT VFR BLD CALC: 34.8 % (ref 42–52)
HGB BLD-MCNC: 12 G/DL (ref 14–18)
MCH RBC QN AUTO: 30.9 PG (ref 25–34)
MCHC RBC AUTO-ENTMCNC: 34.5 G/DL (ref 32–36)
MCV RBC AUTO: 89.7 FL (ref 80–100)
PMV BLD AUTO: 8.9 FL (ref 7.4–10.4)
POTASSIUM SERPL-SCNC: 3.7 MMOL/L (ref 3.5–5.1)
PTT PATIENT: 34.3 SECONDS (ref 21–31)
RED CELL DISTRIBUTION WIDTH CV: 13.8 % (ref 11.5–14.5)
RED CELL DISTRIBUTION WIDTH SD: 45.3 FL (ref 36.4–46.3)
SODIUM SERPL-SCNC: 135 MMOL/L (ref 136–145)
WBC # BLD AUTO: 4.2 K/UL (ref 4.8–10.8)

## 2018-04-13 RX ADMIN — IPRATROPIUM BROMIDE AND ALBUTEROL SULFATE PRN ML: .5; 3 SOLUTION RESPIRATORY (INHALATION) at 11:14

## 2018-04-13 RX ADMIN — CARVEDILOL SCH MG: 6.25 TABLET, FILM COATED ORAL at 20:38

## 2018-04-13 RX ADMIN — ACETYLCYSTEINE SCH MG: 100 INHALANT RESPIRATORY (INHALATION) at 08:23

## 2018-04-13 RX ADMIN — LEVALBUTEROL HYDROCHLORIDE SCH MG: 1.25 SOLUTION RESPIRATORY (INHALATION) at 19:09

## 2018-04-13 RX ADMIN — IPRATROPIUM BROMIDE AND ALBUTEROL SULFATE SCH ML: .5; 3 SOLUTION RESPIRATORY (INHALATION) at 07:13

## 2018-04-13 RX ADMIN — LEVALBUTEROL HYDROCHLORIDE SCH MG: 1.25 SOLUTION RESPIRATORY (INHALATION) at 15:00

## 2018-04-13 RX ADMIN — GUAIFENESIN SCH MG: 600 TABLET, EXTENDED RELEASE ORAL at 20:37

## 2018-04-13 RX ADMIN — Medication SCH MG: at 08:23

## 2018-04-13 RX ADMIN — ACETAMINOPHEN PRN MG: 325 TABLET ORAL at 16:39

## 2018-04-13 RX ADMIN — LEVOFLOXACIN SCH MLS/HR: 5 INJECTION, SOLUTION INTRAVENOUS at 17:25

## 2018-04-13 RX ADMIN — LEVALBUTEROL HYDROCHLORIDE SCH MG: 1.25 SOLUTION RESPIRATORY (INHALATION) at 17:39

## 2018-04-13 NOTE — DIAGNOSTIC IMAGING REPORT
CHEST ONE VIEW PORTABLE



HISTORY:  77 years-old Male assess pneumonia acute atrial fibrillation with

pneumonia



COMPARISON: Chest radiograph 4/10/2018



TECHNIQUE: Portable AP view of the chest



FINDINGS: 

Cardiac silhouette is mildly enlarged, unchanged. Tortuosity of the thoracic

aorta. No pneumothorax. Trace bilateral pleural effusions with persistent patchy

bibasilar opacities. There is slight improvement of the airspace opacities

within the left midlung. Decreased pulmonary vascular congestion. Bones of the

chest appear grossly intact. Degenerative changes are seen within the bilateral

shoulders.



IMPRESSION: 

1. Persistent patchy bibasilar opacities with improved aeration of the left

midlung.

2. Trace bilateral pleural effusions.

3. Cardiomegaly with decreased pulmonary vascular congestion.







The above report was generated using voice recognition software. It may contain

grammatical, syntax or spelling errors.







Electronically signed by:  Danny Chu M.D.

4/13/2018 2:33 PM



Dictated Date/Time:  4/13/2018 2:30 PM

## 2018-04-13 NOTE — CARDIOLOGY FOLLOW-UP
Subjective


Date of Service:


Apr 13, 2018.


Pt evaluation today including:  conversation w/ patient, conversation w/ family

, physical exam, lab review, review of studies, review of inpatient medication 

list, conversation w/ attending


History of Present Illness


This is a very pleasant 77-year-old gentleman who lives in [place] but has had 

healthcare in other states but is currently visiting his niece here in 

[place].  He has had urologic problems due to an indwelling catheter, but 

has not been seen by cardiology locally.  I do not have good records of his 

prior cardiovascular history, he initially told me that he was not aware of 

having any trouble with his heart however his niece tells me that he was 

evaluated in [place] and was told that he did have some cardiac problems (she 

did not know specifics) but refused evaluation at that time.  Further details 

have emerged and he has had stent placement in the past, which we did not know 

about, evidently in [place] perhaps in 2010.





He describes doing well physically until the last year so when he has had a lot 

of difficulties.  Specifically he has less ability to do activities then he 

could in the past, he really cannot live by himself anymore and he has 

difficulty raising his right arm overhead and his speech has become more 

difficult to understand according to his niece.  All of these things occurred 

over the last year, perhaps 6 months.  He does not have lightheadedness or 

dizziness, he has no awareness of his heart rhythm and he does not have 

exertional chest discomfort that he will admit.  On admission here for urologic 

issues he was noted to be in atrial fibrillation and have some congestive heart 

failure findings on chest x-ray.  He also has significant valvular heart 

disease.  Echocardiography shows significant left ventricular dysfunction and 

severe aortic stenosis.





With uncertainty as to the time course of his cardiac illness we did perform 

catheterization yesterday where he has severe coronary disease but nothing to 

explain his left ventricular dysfunction.  At this point however I do not know 

if some of that might be old, especially in view of having prior stent 

placement.  Our recommendation is to have an evaluation for valve replacement, 

but that cannot be done at this institution.





Today he feels well, he has no chest discomfort, no shortness of breath and no 

palpitations.





Social History


Smoking Status:  Former Smoker


History of Alcohol Use:  No





Review of Systems


Respiratory:  + shortness of breath, + dyspnea on exertion, No cough, No 

wheezing


Cardiac:  No chest pain, No orthopnea, No PND, No edema, No palpitations


Inability to raise his right arm above his shoulder or use it normally





Medications


Cardiovascular:











Item Value  Date Time


 


Aspirin 81 mg 4/11/18 0900





 (Aspirin Chew) DAILY/PO 4/13/18 0823


 


Metoprolol 5 mg 4/10/18 1930





Tartrate Q6H  PRN/IV 





 (Lopressor Iv)  











Objective





Vital Signs Past 12 Hours








  Date Time  Temp Pulse Resp B/P (MAP) Pulse Ox O2 Delivery O2 Flow Rate FiO2


 


4/13/18 08:00      Room Air  


 


4/13/18 07:21 37.2 62 18 160/89 (112) 96   


 


4/13/18 07:13  56 18  97 Room Air  


 


4/13/18 04:06 36.5 60 20 161/82 (108) 98 Room Air  


 


4/13/18 04:00      Room Air  


 


4/13/18 00:00      Room Air  


 


4/12/18 23:05 36.4 69 18 162/91 (114) 96 Room Air  








Last Recorded Weight-Kilograms:  76.000


Physical Exam


Constitutional:  


   General Apperance:  heathly-appearing


   Level of Distress:  NAD


Lungs:  


   Respiratory effort:  no dyspnea, good air movement


   Auscultation:  no wheezing, rales/crackles on the left, rales/crackles on 

the right


Cardiovascular:  


   Heart Auscultation:  no rubs, no gallops, II/VI MAURICIO, II/VI WSM, irregular 

rate rhythm


Peripheral Pulses:  


   Bruits:  none appreciated


Extremities:  no edema





Data


Laboratory Results:





Last 24 Hours








Test


  4/13/18


06:17


 


White Blood Count 4.20 K/uL 


 


Red Blood Count 3.88 M/uL 


 


Hemoglobin 12.0 g/dL 


 


Hematocrit 34.8 % 


 


Mean Corpuscular Volume 89.7 fL 


 


Mean Corpuscular Hemoglobin 30.9 pg 


 


Mean Corpuscular Hemoglobin


Concent 34.5 g/dl 


 


 


RDW Standard Deviation 45.3 fL 


 


RDW Coefficient of Variation 13.8 % 


 


Platelet Count 167 K/uL 


 


Mean Platelet Volume 8.9 fL 


 


Activated Partial


Thromboplast Time 34.3 SECONDS 


 


 


Partial Thromboplastin Ratio 1.3 


 


Sodium Level 135 mmol/L 


 


Potassium Level 3.7 mmol/L 


 


Chloride Level 105 mmol/L 


 


Carbon Dioxide Level 22 mmol/L 


 


Anion Gap 8.0 mmol/L 


 


Blood Urea Nitrogen 21 mg/dl 


 


Creatinine 1.18 mg/dl 


 


Est Creatinine Clear Calc


Drug Dose 45.8 ml/min 


 


 


Estimated GFR (


American) 67.6 


 


 


Estimated GFR (Non-


American 58.3 


 


 


BUN/Creatinine Ratio 17.7 


 


Random Glucose 86 mg/dl 


 


Calcium Level 8.3 mg/dl 








Telemetry reviewed: Atrial fibrillation with a controlled heart rate.





Assessment and Plan


1.  Aortic stenosis: He has severe aortic stenosis, to the point where he 

likely will need valve replacement.  I do not know how long this has been known

, it evidently was identified in [place] relatively recently, possibly before 

that as we do not have records.  Our recommendation is to have evaluation for 

valve replacement.  Although it could be done in [place] there are 

insurance issues and his family is leaning toward sending him back home to 

[place] or [place] for that evaluation.





2.  Atrial fibrillation: I do not know how long he has had atrial fibrillation, 

it could be recent or could be long-standing.  His rate is well controlled and 

he is not aware of it.  He was not on anticoagulation as an outpatient.  I 

would recommend using Eliquis, 5 mg twice daily.  Rate control does not seem to 

be an issue at rest, we may have to evaluate that with exertion.





3.  Cardiomyopathy: He has at least a moderate cardiomyopathy with ejection 

fraction 35-40%.  This may be related to his aortic stenosis, however he has 

wall motion abnormalities and conduction abnormalities so some of it could be 

due to ischemic heart disease and we do not have prior records to make that 

determination.  He is not on medical therapy for his cardiomyopathy, his blood 

pressure is elevated and his heart rate is not low.  I am going to start beta-

blockade.





4.  Right arm weakness and dysarthria: His niece reports that his speech is 

much more difficult to understand over the last 6-12 months and he notes 

difficulty using his arm over that same period of time.  Although he attributes 

this to some type of shoulder injury I am concerned that he could have had a 

stroke.  On CT scanning he did have several lacunar strokes, I do not know that 

this would cause these neurologic findings however.  In any case he should be 

on anticoagulation as well as aspirin for his underlying atrial fibrillation 

and coronary disease and this would be treatment for small vessel disease as 

well.





Thank you for allowing me to participate in his care.

## 2018-04-13 NOTE — HOSPITALIST PROGRESS NOTE
Hospitalist Progress Note


Date of Service


Apr 13, 2018.


 (Vicky Mckeon PA-C)





Subjective


Pt evaluation today including:  conversation w/ patient, conversation w/ family

, physical exam, chart review, lab review, review of studies, conversation w/ 

consultant (cardiology)


Pain:  None


PO Intake:  Good


Voiding:  cabrera catheter in place


The patient was seen and examined this morning. Pt reports doing well overall 

except that he has a really coarse cough and is bringing up a lot of thick 

mucous.  He denies fever, chills or sweats.  Pt denies acute shortness of 

breath and is on room air.   He has not ambulated much about the haines unless he 

is working with PT/OT. 





His niece, Amber is present at bedside. Discussion was held regarding cardiac 

cath and that the patient will eventually need to have aortic valve replacement 

which he is agreeable to.  He typically resides in [place], however has been 

here with his neice for about 2 months at this point. Pt would prefer to have 

this surgery back in [place]. We also discussed anticoagulation, and we will 

check into the cost of eliquis. Discussed small lacunar stroke and possible 

etiology of afib, however that we cannot say exactly what the cause is. All 

their questions and concerns were answered. 





Pt is questioning when he'll be discharged, but understands that a short term 

rehab stay would be in his best interest and he is agreeable to this.  His 

niece can transport if PT/OT ok with this.





   Constitutional:  + fatigue, No fever, No chills, No sweats


   Eyes:  No redness, No diplopia


   ENT:  No nasal symptoms, No sore throat, No trouble swallowing


   Respiratory:  + see HPI, + cough, + sputum, No wheezing


   Cardiovascular:  No chest pain, No palpitations


   Abdomen:  No pain, No nausea, No vomiting, No diarrhea, No constipation


   Musculoskeletal:  No joint pain, No swelling


   Neurologic:  No weakness, No numbness/tingling


   Psychiatric:  No depression symptoms, No anxiety (Vicky Mckeon, AZAR AGUILAR)





Objective


Vital Signs











  Date Time  Temp Pulse Resp B/P (MAP) Pulse Ox O2 Delivery O2 Flow Rate FiO2


 


4/13/18 07:21 37.2 62 18 160/89 (112) 96   


 


4/13/18 07:13  56 18  97 Room Air  


 


4/13/18 04:06 36.5 60 20 161/82 (108) 98 Room Air  


 


4/13/18 04:00      Room Air  


 


4/13/18 00:00      Room Air  


 


4/12/18 23:05 36.4 69 18 162/91 (114) 96 Room Air  


 


4/12/18 21:22  64 20 162/89 (113) 100 Room Air  


 


4/12/18 20:22  69 20 169/84 (112) 97 Room Air  


 


4/12/18 20:00     96 Room Air  


 


4/12/18 19:23 37.3 66 20 165/88 (113) 96 Room Air  


 


4/12/18 19:22 37.3 66 20 165/88 (113) 96 Room Air  


 


4/12/18 19:14  66 18  96 Room Air  


 


4/12/18 18:22  67 20 158/85 (109) 97 Room Air  


 


4/12/18 17:22  73 20 164/87 (112) 95 Room Air  


 


4/12/18 16:52  64 20 170/83 (112) 97 Room Air  


 


4/12/18 16:22  62 20 162/81 (108) 98 Room Air  


 


4/12/18 16:07  62 20 162/81 (108) 98 Room Air  


 


4/12/18 16:07  68 20 170/91 (117) 98 Room Air  


 


4/12/18 16:00     97 Room Air  


 


4/12/18 15:52 36.7 69 20 173/107 (129) 97 Room Air  


 


4/12/18 15:46  82 16 169/91 (117) 95 Room Air  


 


4/12/18 15:31  81 16 156/90 (112) 99 Mask 3 


 


4/12/18 12:00     96 Room Air  


 


4/12/18 11:24 36.8 63 16 153/82 (105) 99 Room Air  


 


4/12/18 11:23  78 18  98 Room Air  








 (Vicky Mckeon, FLORA)





Physical Exam


General Appearance:  WD/WN, no apparent distress


Eyes:  PERRL, EOMI


ENT:  hearing grossly normal, pharynx normal


Neck:  no adenopathy, no JVD


Respiratory/Chest:  + pertinent finding (on RA, coarse breath sounds throughout

, + productive cough, no crackles or wheeze)


Cardiovascular:  + systolic murmur (grade III/VI, RUSB), + irregularly irregular


Abdomen:  normal bowel sounds, non tender, soft


Extremities:  non-tender, no calf tenderness, + pedal edema (mild, nonpitting 

BLE)


Neurologic/Psychiatric:  alert, normal mood/affect, oriented x 3


Skin:  normal color, warm/dry


 (Vicky Mckeon PA-C)





Laboratory Results





Last 24 Hours








Test


  4/13/18


06:17


 


White Blood Count 4.20 K/uL 


 


Red Blood Count 3.88 M/uL 


 


Hemoglobin 12.0 g/dL 


 


Hematocrit 34.8 % 


 


Mean Corpuscular Volume 89.7 fL 


 


Mean Corpuscular Hemoglobin 30.9 pg 


 


Mean Corpuscular Hemoglobin


Concent 34.5 g/dl 


 


 


RDW Standard Deviation 45.3 fL 


 


RDW Coefficient of Variation 13.8 % 


 


Platelet Count 167 K/uL 


 


Mean Platelet Volume 8.9 fL 


 


Activated Partial


Thromboplast Time 34.3 SECONDS 


 


 


Partial Thromboplastin Ratio 1.3 


 


Sodium Level 135 mmol/L 


 


Potassium Level 3.7 mmol/L 


 


Chloride Level 105 mmol/L 


 


Carbon Dioxide Level 22 mmol/L 


 


Anion Gap 8.0 mmol/L 


 


Blood Urea Nitrogen 21 mg/dl 


 


Creatinine 1.18 mg/dl 


 


Est Creatinine Clear Calc


Drug Dose 45.8 ml/min 


 


 


Estimated GFR (


American) 67.6 


 


 


Estimated GFR (Non-


American 58.3 


 


 


BUN/Creatinine Ratio 17.7 


 


Random Glucose 86 mg/dl 


 


Calcium Level 8.3 mg/dl 








 (Vicky Mckeon PA-C)





Assessment and Plan


76 y/o male with a history of HTN, prediabetes, colon cancer s/p resection, and 

chronic urinary retention w/chronic Cabrera who presented to the ED on 4/10 with 

cough and decreased appetite.





Atrial fibrillation, chronic--stable, rate controlled


CAD


Hx of cardiac stenting in ~ 2010


- Pt in rate controlled a-fib with HR 70-80 overnight


- TSH, potassium, magnesium WNL


- Echo shows EF 35-40%.  Mid to apical septum akinesis.  Severe inferior 

hypokinesis.  Moderate inferolateral hypokinesis.  Severe calcified aortic 

stenosis.  Moderate pulmonary HTN with PASP 55-60 mmHg.  Grade II diastolic 

dysfunction


- Cardiology consulted, appreciate recs  


   - Likely chronic a-fib.  now off heparin gtt.  Severe AS.


   - S/p cardiac cath on 4/12 - severe single vessel disease, aortic valve to 

be replaced by tertiary care center.


- Heparin gtt off, discussed risk vs benefits of anticoagulation with the 

patient - will recommend Eliquis 5 mg BID - ask CM to check the cost.





Chest pain--resolved


-Troponin peaked at 0.063, trending down.  Likely secondary to demand ischemia


-Lipid panel WNL


-Will d/c empiric Lipitor due to low cholesterol in elderly pt, increased ICH 

risk


-HgbA1c 5.0





Combined systolic/diastolic CHF--stable


-Negative fluid balance here





Multifocal PNA--stable


-Continue Levaquin 750 mg IV q48h (renal dosing), day #5 - finish today.


- Switch to xopenex QID and PRN for SOB/wheezing 


- BCx NGTD, sputum culture pending


- Add incentive spirometry, flutter, mucinex


- Will repeat CXR portable today





HTN--no meds at home per pt


-Hydralazine 10 mg IV q6h prn SBP >180





Lacuna stroke


- CT head completed showing a 1.4 cm old lacunar infarct within the left 

cerebellar hemisphere - this may explain his URE slight weakness and pt's niece 

who describes a change in his speech in the past year. 


- No neurology consult at this time - tx with asa, bp control, anticoagulation 

and pt/ot





Mild hyponatremia--stable, asymptomatic


-Continue to monitor





H/o colon cancer s/p resection--noted





Urinary retention w/ indwelling Cabrera--noted





DVT prophylaxis: teds, scds, off heparin gtt





Code Status: DNR





Dispo


-From [place], in town visiting niece.  Had lived alone- plan d/c to [facility] 

possible in next 24 hours.   


 (Vicky Mckeon PA-C)








===================================================================





I personally interviewed and examined the patient.


I agree with history of present illness and physical exam mentioned above, I 

also performed my own history taking and examination.


Past medical history and review of system has been obtained by myself


I reviewed all pertinent labs and studies


Reviewed current medications


I discussed and formulated of the assessment and plan mentioned above.


Please refer to the Summary mentioned below.





77-year-old man with history of colon cancer status post resection, chronic 

urinary retention with chronic indwelling Cabrera catheter and hypertension 

presented to the ED with cough, chest pain and shortness of breath.  He was 

found to have atrial fibrillation patient does not know if it is new or old, 2D 

echo showed ejection fraction of 35-40% with hypokinesia and aortic stenosis , 

troponin was borderline elevated, patient had chest pain and imaging studies 

showed multifocal pneumonia he was started on Levaquin IV and bronchodilators.  

Also started on heparin drip by cardiologist, cardiology consult appreciated s/

p  cardiac catheterization, showed single vessel disease, results attached below


suspected other reason for global hypokinesia 





will do overnight nocturnal O2 sat study





recommended eliquis for anticoagulation





needs evaluation for aortic valve replacement





General Appearance: not in acute distress


Eyes: normal Sclerae,  extraocular muscle intact


ENT:  hearing grossly normal


Neck:  supple


Respiratory/Chest: normal air entry  bilateral ,no respiratory distress, no 

accessory muscle use


Cardiovascular:   irregular irregularity with pansystolic murmur


Abdomen:   non tender, soft, no masses


Extremities:  no edema


musculoskeletal: no significant swelling or inflammation in any joint


Neurologic/Psychiatric: Awake alert oriented times place and person moves all 

extremities sensation intact cranial nerves II-12 appear to be intact


Skin:  normal color, warm/dry, no rash








cath results:


1. Severe single-vessel coronary artery disease


 -  subtotal in-stent occlusion of dominant, distal circumflex prior to left PDA


 -  widely patent distal LAD stent





Recommendations:


Nonischemic etiology for patient's severe global cardiomyopathy.  Refer to 

tertiary center for evaluation of aortic valve replacement.


Continued ASCVD risk factor modification


 (Stanislav Kumar MD)

## 2018-04-14 VITALS
DIASTOLIC BLOOD PRESSURE: 78 MMHG | HEART RATE: 59 BPM | SYSTOLIC BLOOD PRESSURE: 145 MMHG | TEMPERATURE: 98.42 F | OXYGEN SATURATION: 97 %

## 2018-04-14 VITALS
OXYGEN SATURATION: 94 % | SYSTOLIC BLOOD PRESSURE: 159 MMHG | TEMPERATURE: 97.7 F | DIASTOLIC BLOOD PRESSURE: 74 MMHG | HEART RATE: 71 BPM

## 2018-04-14 VITALS
DIASTOLIC BLOOD PRESSURE: 81 MMHG | OXYGEN SATURATION: 97 % | SYSTOLIC BLOOD PRESSURE: 151 MMHG | TEMPERATURE: 97.34 F | HEART RATE: 59 BPM

## 2018-04-14 VITALS — OXYGEN SATURATION: 99 % | HEART RATE: 62 BPM

## 2018-04-14 VITALS
DIASTOLIC BLOOD PRESSURE: 79 MMHG | HEART RATE: 57 BPM | SYSTOLIC BLOOD PRESSURE: 134 MMHG | TEMPERATURE: 97.52 F | OXYGEN SATURATION: 99 %

## 2018-04-14 VITALS
HEART RATE: 53 BPM | TEMPERATURE: 97.52 F | SYSTOLIC BLOOD PRESSURE: 132 MMHG | DIASTOLIC BLOOD PRESSURE: 69 MMHG | OXYGEN SATURATION: 94 %

## 2018-04-14 VITALS — OXYGEN SATURATION: 96 % | HEART RATE: 67 BPM

## 2018-04-14 VITALS — OXYGEN SATURATION: 99 %

## 2018-04-14 VITALS
SYSTOLIC BLOOD PRESSURE: 168 MMHG | DIASTOLIC BLOOD PRESSURE: 89 MMHG | OXYGEN SATURATION: 92 % | TEMPERATURE: 98.6 F | HEART RATE: 80 BPM

## 2018-04-14 VITALS — HEART RATE: 65 BPM | OXYGEN SATURATION: 94 %

## 2018-04-14 VITALS — OXYGEN SATURATION: 96 %

## 2018-04-14 VITALS — OXYGEN SATURATION: 93 % | HEART RATE: 55 BPM

## 2018-04-14 LAB — PTT PATIENT: 32.4 SECONDS (ref 21–31)

## 2018-04-14 RX ADMIN — CARVEDILOL SCH MG: 6.25 TABLET, FILM COATED ORAL at 20:44

## 2018-04-14 RX ADMIN — LEVALBUTEROL HYDROCHLORIDE SCH MG: 1.25 SOLUTION RESPIRATORY (INHALATION) at 06:59

## 2018-04-14 RX ADMIN — GUAIFENESIN SCH MG: 600 TABLET, EXTENDED RELEASE ORAL at 08:50

## 2018-04-14 RX ADMIN — ACETAMINOPHEN PRN MG: 325 TABLET ORAL at 15:44

## 2018-04-14 RX ADMIN — Medication SCH MG: at 08:49

## 2018-04-14 RX ADMIN — CARVEDILOL SCH MG: 6.25 TABLET, FILM COATED ORAL at 09:00

## 2018-04-14 RX ADMIN — APIXABAN SCH MG: 2.5 TABLET, FILM COATED ORAL at 20:44

## 2018-04-14 RX ADMIN — LEVALBUTEROL HYDROCHLORIDE SCH MG: 1.25 SOLUTION RESPIRATORY (INHALATION) at 14:22

## 2018-04-14 RX ADMIN — GUAIFENESIN SCH MG: 600 TABLET, EXTENDED RELEASE ORAL at 20:46

## 2018-04-14 RX ADMIN — LEVALBUTEROL HYDROCHLORIDE SCH MG: 1.25 SOLUTION RESPIRATORY (INHALATION) at 01:55

## 2018-04-14 RX ADMIN — CARVEDILOL SCH MG: 3.12 TABLET, FILM COATED ORAL at 20:43

## 2018-04-14 RX ADMIN — LEVALBUTEROL HYDROCHLORIDE SCH MG: 1.25 SOLUTION RESPIRATORY (INHALATION) at 19:28

## 2018-04-14 NOTE — CARDIOLOGY FOLLOW-UP
Subjective


Date of Service:


Apr 14, 2018.


Pt evaluation today including:  conversation w/ patient, conversation w/ family

, physical exam, lab review, review of inpatient medication list


History of Present Illness


This is a very pleasant 77-year-old gentleman who lives in [place] but has had 

healthcare in other states but is currently visiting his niece here in 

[place].  He has had urologic problems due to an indwelling catheter, but 

has not been seen by cardiology locally.  I do not have good records of his 

prior cardiovascular history, he initially told me that he was not aware of 

having any trouble with his heart however his niece tells me that he was 

evaluated in [place] and was told that he did have some cardiac problems (she 

did not know specifics) but refused evaluation at that time.  Further details 

have emerged and he has had stent placement in the past, which we did not know 

about, evidently in [place] perhaps in 2010.





He describes doing well physically until the last year so when he has had a lot 

of difficulties.  Specifically he has less ability to do activities then he 

could in the past, he really cannot live by himself anymore and he has 

difficulty raising his right arm overhead and his speech has become more 

difficult to understand according to his niece.  All of these things occurred 

over the last year, perhaps 6 months.  He does not have lightheadedness or 

dizziness, he has no awareness of his heart rhythm and he does not have 

exertional chest discomfort that he will admit.  On admission here for urologic 

issues he was noted to be in atrial fibrillation and have some congestive heart 

failure findings on chest x-ray.  He also has significant valvular heart 

disease.  Echocardiography shows significant left ventricular dysfunction and 

severe aortic stenosis.





With uncertainty as to the time course of his cardiac illness we did perform 

catheterization yesterday where he has severe coronary disease but nothing to 

explain his left ventricular dysfunction.  At this point however I do not know 

if some of that might be old, especially in view of having prior stent 

placement.  Our recommendation is to have an evaluation for valve replacement, 

but that cannot be done at this institution.





Today he feels well, he has no chest discomfort, no shortness of breath and no 

palpitations.  He is anxious to leave the hospital, but he knows he is going to 

rehab.  He seems willing to stay in the area to get his valve evaluated.





Social History


Smoking Status:  Former Smoker


History of Alcohol Use:  No





Review of Systems


Respiratory:  + see HPI, + cough, + sputum, No wheezing


Cardiac:  No chest pain, No palpitations


Inability to raise his right arm above his shoulder or use it normally





Medications


Cardiovascular:











Item Value  Date Time


 


Carvedilol 6.25 mg 4/13/18 2100





 (Coreg Tab) BID/PO 4/13/18 2038


 


Aspirin 81 mg 4/11/18 0900





 (Aspirin Chew) DAILY/PO 4/14/18 0849


 


Metoprolol 5 mg 4/10/18 1930





Tartrate Q6H  PRN/IV 





 (Lopressor Iv)  











Objective





Vital Signs Past 12 Hours








  Date Time  Temp Pulse Resp B/P (MAP) Pulse Ox O2 Delivery O2 Flow Rate FiO2


 


4/14/18 08:06 37.0 80 20 168/89 (115) 92   


 


4/14/18 08:00      Room Air  


 


4/14/18 07:30  62 18  99 Room Air  


 


4/14/18 05:22  55 16  93 Room Air  


 


4/14/18 04:00 36.4 57 20 134/79 (97) 96 Room Air  


 


4/14/18 04:00     99 Room Air  


 


4/14/18 00:01     99 Room Air  


 


4/13/18 23:52 36.8 78 18 130/68 (88) 96 Room Air  








Last Recorded Weight-Kilograms:  75.800


Physical Exam


Constitutional:  


   General Apperance:  heathly-appearing


   Level of Distress:  NAD


Lungs:  


   Respiratory effort:  no dyspnea, good air movement


   Auscultation:  no wheezing, rales/crackles on the left, rales/crackles on 

the right


Cardiovascular:  


   Heart Auscultation:  no rubs, no gallops, II/VI MAURICIO, II/VI WSM, irregular 

rate rhythm


Peripheral Pulses:  


   Bruits:  none appreciated


Extremities:  no edema


Right wrist catheterization site looks good





Data


Laboratory Results:





Last 24 Hours








Test


  4/14/18


06:36


 


Activated Partial


Thromboplast Time 32.4 SECONDS 


 


 


Partial Thromboplastin Ratio 1.2 








Telemetry reviewed: A. fib, heart rate somewhat low on his current dose of 

carvedilol resulting it being held today.  Heart rate is in the 30s at times 

although averaging higher than that.





Assessment and Plan


1.  Aortic stenosis: He has severe aortic stenosis, to the point where he 

likely will need valve replacement.  I do not know how long this has been known

, it evidently was identified in [place] relatively recently, possibly before 

that as we do not have records.  Our recommendation is to have evaluation for 

valve replacement.  He and his family have determined that we can probably do 

that at [facility], they are working on insurance situation.  We 

will tentatively plan on having an evaluation set up, I will make those 

arrangements early this next week.





2.  Atrial fibrillation: I do not know how long he has had atrial fibrillation, 

it could be recent or could be long-standing.  His rate is well controlled, in 

fact it is too slow now on his current dose of carvedilol.  I am going to 

reduce his carvedilol.  He was not on anticoagulation as an outpatient.  I 

would recommend using Eliquis, 5 mg twice daily.  .





3.  Cardiomyopathy: He has at least a moderate cardiomyopathy with ejection 

fraction 35-40%.  This may be related to his aortic stenosis, however he has 

wall motion abnormalities and conduction abnormalities so some of it could be 

due to ischemic heart disease and we do not have prior records to make that 

determination.  He is not on medical therapy for his cardiomyopathy, his blood 

pressure is elevated and his heart rate is now slow on carvedilol 6.25 mg twice 

daily.  I am going to reduce his dose to 3.125 mg twice daily.





4.  Right arm weakness and dysarthria: His niece reports that his speech is 

much more difficult to understand over the last 6-12 months and he notes 

difficulty using his arm over that same period of time.  Although he attributes 

this to some type of shoulder injury I am concerned that he could have had a 

stroke.  On CT scanning he did have several lacunar strokes, I do not know that 

this would cause these neurologic findings however.  In any case he should be 

on anticoagulation as well as aspirin for his underlying atrial fibrillation 

and coronary disease and this would be treatment for small vessel disease as 

well.





Thank you for allowing me to participate in his care.

## 2018-04-14 NOTE — PROGRESS NOTE
Subjective


Date of Service:


Apr 14, 2018.


Subjective


Pt evaluation today including:  conversation w/ patient


Patient today has no complaints.


Patient wants to go to rehab.





Problem List


Medical Problems:


(1) Abnormal ECG


Status: Acute  





(2) Catheter-associated urinary tract infection


Status: Acute  





(3) Elevated troponin I level


Status: Acute  





(4) Multifocal pneumonia


Status: Acute  





(5) New onset a-fib


Status: Acute  











Review of Systems


   Constitutional:  + fatigue, No fever, No chills, No sweats


   Eyes:  No redness, No diplopia


   ENT:  No nasal symptoms, No sore throat, No trouble swallowing


   Respiratory:  + see HPI, + cough, + sputum, No wheezing


   Cardiovascular:  No chest pain, No palpitations


   Abdomen:  No pain, No nausea, No vomiting, No diarrhea, No constipation


   Musculoskeletal:  No joint pain, No swelling


   Neurologic:  No weakness, No numbness/tingling


   Psychiatric:  No depression symptoms, No anxiety


All Other Systems:  Reviewed and Negative





Objective


Vital Signs











  Date Time  Temp Pulse Resp B/P (MAP) Pulse Ox O2 Delivery O2 Flow Rate FiO2


 


4/14/18 12:00      Room Air  


 


4/14/18 11:47 36.5 71 16 159/74 (102) 94   


 


4/14/18 08:06 37.0 80 20 168/89 (115) 92   


 


4/14/18 08:00      Room Air  


 


4/14/18 07:30  62 18  99 Room Air  


 


4/14/18 05:22  55 16  93 Room Air  


 


4/14/18 04:00 36.4 57 20 134/79 (97) 96 Room Air  


 


4/14/18 04:00     99 Room Air  


 


4/14/18 00:01     99 Room Air  


 


4/13/18 23:52 36.8 78 18 130/68 (88) 96 Room Air  


 


4/13/18 20:00     99 Room Air  


 


4/13/18 19:09  59 18  99 Room Air  


 


4/13/18 19:02 36.5 58 16 142/83 (102) 97 Room Air  


 


4/13/18 16:00      Room Air  


 


4/13/18 15:00 37.0 59 18 145/85 (105) 98 Room Air  











Physical Exam


Comments:


General Appearance:  WD/WN, no apparent distress


Eyes:  PERRL, EOMI


ENT:  hearing grossly normal, pharynx normal


Neck:  no adenopathy, no JVD


Respiratory/Chest:  + pertinent finding (on RA, coarse breath sounds throughout

, + productive cough, no crackles or wheeze)


Cardiovascular:  + systolic murmur (grade III/VI, RUSB), + irregularly irregular


Abdomen:  normal bowel sounds, non tender, soft


Extremities:  non-tender, no calf tenderness, + pedal edema (mild, nonpitting 

BLE)


Neurologic/Psychiatric:  alert, normal mood/affect, oriented x 3


Skin:  normal color, warm/dry





Laboratory Results





Last 24 Hours








Test


  4/14/18


06:36


 


Activated Partial


Thromboplast Time 32.4 SECONDS 


 


 


Partial Thromboplastin Ratio 1.2 











Assessment and Plan








76 y/o male with a history of HTN, prediabetes, colon cancer s/p resection, and 

chronic urinary retention w/chronic Shane who presented to the ED on 4/10 with 

cough and decreased appetite.





Atrial fibrillation, chronic--stable, rate controlled


CAD


Hx of cardiac stenting in ~ 2010


- Pt in rate controlled a-fib with HR 70-80 overnight


- TSH, potassium, magnesium WNL


- Echo shows EF 35-40%.  Mid to apical septum akinesis.  Severe inferior 

hypokinesis.  Moderate inferolateral hypokinesis.  Severe calcified aortic 

stenosis.  Moderate pulmonary HTN with PASP 55-60 mmHg.  Grade II diastolic 

dysfunction


-Overnight sleep study was negative as 


Did not have more than 2 minutes with o2 sat below 88%


- Cardiology consulted, appreciate recs  


   - Likely chronic a-fib.  now off heparin gtt.  Severe AS.


   - S/p cardiac cath on 4/12 - severe single vessel disease, aortic valve to 

be replaced by tertiary care center.


- Heparin gtt off, discussed risk vs benefits of anticoagulation with the 

patient 


- Eliquis 5 mg BID - ask CM to check the cost.





Chest pain--resolved


-Troponin peaked at 0.063, trending down.  Likely secondary to demand ischemia


-Lipid panel WNL


-Will d/c empiric Lipitor due to low cholesterol in elderly pt, increased ICH 

risk


-HgbA1c 5.0





Combined systolic/diastolic CHF--stable


-Negative fluid balance here





Multifocal PNA--stable


-Continue Levaquin 750 mg IV q48h (renal dosing), will complete 7 days. started 

on 4-11 (Last dose will be 4-17, can be taken orally)


- Switch to xopenex QID and PRN for SOB/wheezing 


- BCx NGTD, sputum culture pending


- Add incentive spirometry, flutter, mucinex


- Will repeat CXR portable today





HTN--no meds at home per pt


-Hydralazine 10 mg IV q6h prn SBP >180





Lacuna stroke


- CT head completed showing a 1.4 cm old lacunar infarct within the left 

cerebellar hemisphere - this may explain his URE slight weakness and pt's niece 

who describes a change in his speech in the past year. 


- No neurology consult at this time - tx with asa, bp control, anticoagulation 

and pt/ot





Mild hyponatremia--stable, asymptomatic


-Continue to monitor





H/o colon cancer s/p resection--noted





Urinary retention w/ indwelling Shane--noted





DVT prophylaxis: teds, scds, off heparin gtt





Code Status: DNR





Dispo


-From [place], in town visiting niece.  Had lived alone- plan d/c to [facility] 

possible in next 24 hours.   


awaiting placement


Cardio will coordinate treatment for aortic stenosis this week.


This however should not postpone discharge to rehab.

## 2018-04-15 VITALS — OXYGEN SATURATION: 96 %

## 2018-04-15 VITALS
OXYGEN SATURATION: 97 % | TEMPERATURE: 98.06 F | HEART RATE: 49 BPM | DIASTOLIC BLOOD PRESSURE: 69 MMHG | SYSTOLIC BLOOD PRESSURE: 161 MMHG

## 2018-04-15 VITALS
SYSTOLIC BLOOD PRESSURE: 162 MMHG | DIASTOLIC BLOOD PRESSURE: 84 MMHG | HEART RATE: 64 BPM | TEMPERATURE: 98.6 F | OXYGEN SATURATION: 95 %

## 2018-04-15 VITALS
SYSTOLIC BLOOD PRESSURE: 131 MMHG | TEMPERATURE: 98.06 F | OXYGEN SATURATION: 94 % | DIASTOLIC BLOOD PRESSURE: 69 MMHG | HEART RATE: 60 BPM

## 2018-04-15 VITALS — HEART RATE: 56 BPM | OXYGEN SATURATION: 94 %

## 2018-04-15 VITALS
SYSTOLIC BLOOD PRESSURE: 159 MMHG | OXYGEN SATURATION: 96 % | HEART RATE: 51 BPM | DIASTOLIC BLOOD PRESSURE: 75 MMHG | TEMPERATURE: 98.24 F

## 2018-04-15 VITALS — OXYGEN SATURATION: 99 % | HEART RATE: 56 BPM

## 2018-04-15 VITALS
HEART RATE: 54 BPM | SYSTOLIC BLOOD PRESSURE: 155 MMHG | DIASTOLIC BLOOD PRESSURE: 92 MMHG | TEMPERATURE: 97.7 F | OXYGEN SATURATION: 94 %

## 2018-04-15 VITALS
HEART RATE: 57 BPM | SYSTOLIC BLOOD PRESSURE: 162 MMHG | TEMPERATURE: 98.96 F | OXYGEN SATURATION: 100 % | DIASTOLIC BLOOD PRESSURE: 78 MMHG

## 2018-04-15 VITALS — OXYGEN SATURATION: 94 % | HEART RATE: 53 BPM

## 2018-04-15 VITALS — OXYGEN SATURATION: 95 % | HEART RATE: 56 BPM

## 2018-04-15 VITALS — OXYGEN SATURATION: 100 %

## 2018-04-15 LAB
EOSINOPHIL NFR BLD AUTO: 169 K/UL (ref 130–400)
HCT VFR BLD CALC: 36.3 % (ref 42–52)
HGB BLD-MCNC: 12.1 G/DL (ref 14–18)
MCH RBC QN AUTO: 29.8 PG (ref 25–34)
MCHC RBC AUTO-ENTMCNC: 33.3 G/DL (ref 32–36)
MCV RBC AUTO: 89.4 FL (ref 80–100)
PMV BLD AUTO: 8.5 FL (ref 7.4–10.4)
PTT PATIENT: 33.7 SECONDS (ref 21–31)
RED CELL DISTRIBUTION WIDTH CV: 13.7 % (ref 11.5–14.5)
RED CELL DISTRIBUTION WIDTH SD: 45.1 FL (ref 36.4–46.3)
WBC # BLD AUTO: 5.29 K/UL (ref 4.8–10.8)

## 2018-04-15 RX ADMIN — IPRATROPIUM BROMIDE AND ALBUTEROL SULFATE PRN ML: .5; 3 SOLUTION RESPIRATORY (INHALATION) at 14:30

## 2018-04-15 RX ADMIN — LEVALBUTEROL HYDROCHLORIDE SCH MG: 1.25 SOLUTION RESPIRATORY (INHALATION) at 01:55

## 2018-04-15 RX ADMIN — GUAIFENESIN SCH MG: 600 TABLET, EXTENDED RELEASE ORAL at 20:59

## 2018-04-15 RX ADMIN — CARVEDILOL SCH MG: 6.25 TABLET, FILM COATED ORAL at 08:46

## 2018-04-15 RX ADMIN — Medication SCH MG: at 08:45

## 2018-04-15 RX ADMIN — CARVEDILOL SCH MG: 3.12 TABLET, FILM COATED ORAL at 20:58

## 2018-04-15 RX ADMIN — CARVEDILOL SCH MG: 6.25 TABLET, FILM COATED ORAL at 20:58

## 2018-04-15 RX ADMIN — GUAIFENESIN SCH MG: 600 TABLET, EXTENDED RELEASE ORAL at 08:47

## 2018-04-15 RX ADMIN — LEVALBUTEROL HYDROCHLORIDE SCH MG: 1.25 SOLUTION RESPIRATORY (INHALATION) at 19:16

## 2018-04-15 RX ADMIN — APIXABAN SCH MG: 2.5 TABLET, FILM COATED ORAL at 08:47

## 2018-04-15 RX ADMIN — LEVALBUTEROL HYDROCHLORIDE SCH MG: 1.25 SOLUTION RESPIRATORY (INHALATION) at 06:57

## 2018-04-15 RX ADMIN — CARVEDILOL SCH MG: 3.12 TABLET, FILM COATED ORAL at 09:00

## 2018-04-15 RX ADMIN — LEVALBUTEROL HYDROCHLORIDE SCH MG: 1.25 SOLUTION RESPIRATORY (INHALATION) at 14:31

## 2018-04-15 RX ADMIN — LEVOFLOXACIN SCH MLS/HR: 5 INJECTION, SOLUTION INTRAVENOUS at 17:46

## 2018-04-15 RX ADMIN — APIXABAN SCH MG: 2.5 TABLET, FILM COATED ORAL at 20:59

## 2018-04-15 NOTE — PROGRESS NOTE
Subjective


Date of Service:


Apr 15, 2018.


Subjective


Pt evaluation today including:  conversation w/ patient


Patient has no new complaints today.





Problem List


Medical Problems:


(1) Abnormal ECG


Status: Acute  





(2) Catheter-associated urinary tract infection


Status: Acute  





(3) Elevated troponin I level


Status: Acute  





(4) Multifocal pneumonia


Status: Acute  





(5) New onset a-fib


Status: Acute  











Review of Systems


Constitutional:  No fever, No chills


Eyes:  No worsening of vision


ENT:  No hearing loss


Respiratory:  + cough, + sputum


Cardiac:  No chest pain


Abdomen:  No pain


Musculoskeletal:  No joint pain


Neurologic:  No memory loss


Psychiatric:  No depression symptoms


Heme:  No abnormal bleeding/bruising


Endo:  No fatigue


Skin:  No rash


All Other Systems:  Reviewed and Negative





Medications





Current Inpatient Medications








 Medications


  (Trade)  Dose


 Ordered  Sig/Hudson


 Route  Start Time


 Stop Time Status Last Admin


Dose Admin


 


 Acetaminophen


  (Tylenol Tab)  650 mg  Q4H  PRN


 PO  4/10/18 19:15


 5/10/18 19:14  4/14/18 15:44


650 MG


 


 Al Hydrox/Mg


 Hydrox/Simethicone


  (Maalox Max Susp)  15 ml  Q4H  PRN


 PO  4/10/18 19:15


 5/10/18 19:14   


 


 


 Magnesium


 Hydroxide


  (Milk Of


 Magnesia Susp)  30 ml  Q12H  PRN


 PO  4/10/18 19:15


 5/10/18 19:14   


 


 


 Ondansetron HCl


  (Zofran Inj)  4 mg  Q6H  PRN


 IV  4/10/18 19:15


 5/10/18 19:14   


 


 


 Nitroglycerin


  (Nitrostat Tab)  0.4 mg  UD  PRN


 SL  4/10/18 19:15


 5/10/18 19:14   


 


 


 Morphine Sulfate


  (MoRPHine


 SULFATE INJ)  2 mg  Q30M  PRN


 IV  4/10/18 19:15


 4/24/18 19:14   


 


 


 Polyethylene


  (Miralax Powder


 Packet)  17 gm  DAILY  PRN


 PO  4/10/18 19:15


 5/10/18 19:14   


 


 


 Albuterol/


 Ipratropium


  (Duoneb)  3 ml  Q4H  PRN


 INH  4/10/18 19:15


 5/10/18 19:14  4/15/18 14:30


3 ML


 


 Hydralazine HCl


  (HydrALAZINE INJ)  10 mg  Q6H  PRN


 IV.  4/10/18 19:15


 5/10/18 19:14   


 


 


 Metoprolol


 Tartrate


  (Lopressor Iv)  5 mg  Q6H  PRN


 IV  4/10/18 19:30


 5/10/18 19:29   


 


 


 Aspirin


  (Aspirin Chew)  81 mg  DAILY


 PO  4/11/18 09:00


 5/11/18 08:59  4/15/18 08:45


81 MG


 


 Levofloxacin 750


 mg/Prmx  150 ml @ 


 100 mls/hr  Q48H


 IV  4/13/18 18:00


 4/18/18 17:59  4/15/18 17:46


100 MLS/HR


 


 Levalbuterol


  (Xopenex 1.25MG/


 3ML Neb)  1.25 mg  Q6R


 INH  4/13/18 11:15


 5/13/18 11:14  4/15/18 19:16


1.25 MG


 


 Guaifenesin


  (Mucinex Contr


 Rel Tab)  1,200 mg  Q12


 PO  4/13/18 21:00


 5/13/18 20:59  4/15/18 20:59


1,200 MG


 


 Carvedilol


  (Coreg Tab)  6.25 mg  BID


 PO  4/13/18 21:00


 5/13/18 20:59  4/13/18 20:38


6.25 MG


 


 Carvedilol


  (Coreg Tab)  3.125 mg  BID


 PO  4/14/18 21:00


 5/14/18 20:59   


 


 


 Apixaban


  (Eliquis Tab)  5 mg  BID


 PO  4/14/18 21:00


 5/14/18 20:59  4/15/18 20:59


5 MG











Objective


Vital Signs











  Date Time  Temp Pulse Resp B/P (MAP) Pulse Ox O2 Delivery O2 Flow Rate FiO2


 


4/15/18 19:17 37.2 57 18 162/78 (106) 100 Room Air  


 


4/15/18 19:16  56 16  99 Room Air  


 


4/15/18 16:00      Room Air  


 


4/15/18 15:05 37.0 64 16 162/84 (110) 95 Room Air  


 


4/15/18 14:31  53 16  94 Room Air  


 


4/15/18 12:00      Room Air  


 


4/15/18 11:55 36.7 49 16 161/69 (99) 97   


 


4/15/18 08:23 36.8 51 18 159/75 (103) 96   


 


4/15/18 08:00      Room Air  


 


4/15/18 07:40  56 16  94 Room Air  


 


4/15/18 04:00     95 Room Air  


 


4/15/18 04:00 36.5 54 18 155/92 (113) 94 Room Air  


 


4/15/18 01:56  56 16  95 Room Air  


 


4/15/18 00:01     96 Room Air 3.0 


 


4/14/18 23:40 36.3 59 20 151/81 (104) 97 Room Air  











Physical Exam


Comments:


General Appearance:  WD/WN, no apparent distress


Eyes:  PERRL, EOMI


ENT:  hearing grossly normal, pharynx normal


Neck:  no adenopathy, no JVD


Respiratory/Chest:  + pertinent finding (on RA, coarse breath sounds throughout

, + productive cough, no crackles or wheeze)


Cardiovascular:  + systolic murmur (grade III/VI, RUSB), + irregularly irregular


Abdomen:  normal bowel sounds, non tender, soft


Extremities:  non-tender, no calf tenderness, + pedal edema (mild, nonpitting 

BLE)


Neurologic/Psychiatric:  alert, normal mood/affect, oriented x 3


Skin:  normal color, warm/dry





Laboratory Results





Last 24 Hours








Test


  4/15/18


06:31


 


White Blood Count 5.29 K/uL 


 


Red Blood Count 4.06 M/uL 


 


Hemoglobin 12.1 g/dL 


 


Hematocrit 36.3 % 


 


Mean Corpuscular Volume 89.4 fL 


 


Mean Corpuscular Hemoglobin 29.8 pg 


 


Mean Corpuscular Hemoglobin


Concent 33.3 g/dl 


 


 


RDW Standard Deviation 45.1 fL 


 


RDW Coefficient of Variation 13.7 % 


 


Platelet Count 169 K/uL 


 


Mean Platelet Volume 8.5 fL 


 


Activated Partial


Thromboplast Time 33.7 SECONDS 


 


 


Partial Thromboplastin Ratio 1.3 











Assessment and Plan








78 y/o male with a history of HTN, prediabetes, colon cancer s/p resection, and 

chronic urinary retention w/chronic Shane who presented to the ED on 4/10 with 

cough and decreased appetite.





Atrial fibrillation, chronic--stable, rate controlled


CAD


Hx of cardiac stenting in ~ 2010


- Pt in rate controlled a-fib with HR 70-80 overnight


- TSH, potassium, magnesium WNL


- Echo shows EF 35-40%.  Mid to apical septum akinesis.  Severe inferior 

hypokinesis.  Moderate inferolateral hypokinesis.  Severe calcified aortic 

stenosis.  Moderate pulmonary HTN with PASP 55-60 mmHg.  Grade II diastolic 

dysfunction


-Overnight sleep study was negative as 


Did not have more than 2 minutes with o2 sat below 88%


- Cardiology consulted, appreciate recs  


   - Likely chronic a-fib.  now off heparin gtt.  Severe AS.


   - S/p cardiac cath on 4/12 - severe single vessel disease, aortic valve to 

be replaced by tertiary care center.


- Heparin gtt off, discussed risk vs benefits of anticoagulation with the 

patient 


- Eliquis 5 mg BID - ask CM to check the cost.





Chest pain--resolved


-Troponin peaked at 0.063, trending down.  Likely secondary to demand ischemia


-Lipid panel WNL


-Will d/c empiric Lipitor due to low cholesterol in elderly pt, increased ICH 

risk


-HgbA1c 5.0





Combined systolic/diastolic CHF--stable


-Negative fluid balance here





Multifocal PNA--stable


-Continue Levaquin 750 mg IV q48h (renal dosing), will complete 7 days. started 

on 4-11 (Last dose will be 4-17, can be taken orally)


- Switch to xopenex QID and PRN for SOB/wheezing 


- BCx NGTD, sputum culture pending


- Add incentive spirometry, flutter, mucinex


-will likely require x-ray in 4 weeks to ensure resolution.





HTN--no meds at home per pt


-Hydralazine 10 mg IV q6h prn SBP >180





[place]acuna stroke


- CT head completed showing a 1.4 cm old lacunar infarct within the left 

cerebellar hemisphere - this may explain his URE slight weakness and pt's niece 

who describes a change in his speech in the past year. 


- No neurology consult at this time - tx with asa, bp control, anticoagulation 

and pt/ot





Mild hyponatremia--stable, asymptomatic


-Continue to monitor





H/o colon cancer s/p resection--noted





Urinary retention w/ indwelling Shane--noted





DVT prophylaxis: teds, scds, off heparin gtt





Code Status: DNR





Dispo


-From [place], in town visiting niece.  Had lived alone- plan d/c to [place] 

possibly Monday, as insurance office is closed on weekend.


awaiting placement


Cardio will coordinate treatment for aortic stenosis this week.


This however should not postpone discharge to rehab.

## 2018-04-16 VITALS
TEMPERATURE: 98.06 F | SYSTOLIC BLOOD PRESSURE: 167 MMHG | DIASTOLIC BLOOD PRESSURE: 81 MMHG | HEART RATE: 52 BPM | OXYGEN SATURATION: 100 %

## 2018-04-16 VITALS
OXYGEN SATURATION: 98 % | TEMPERATURE: 98.06 F | DIASTOLIC BLOOD PRESSURE: 73 MMHG | HEART RATE: 52 BPM | SYSTOLIC BLOOD PRESSURE: 134 MMHG

## 2018-04-16 VITALS — HEART RATE: 58 BPM | OXYGEN SATURATION: 97 %

## 2018-04-16 VITALS — OXYGEN SATURATION: 100 %

## 2018-04-16 VITALS
DIASTOLIC BLOOD PRESSURE: 67 MMHG | OXYGEN SATURATION: 98 % | HEART RATE: 50 BPM | TEMPERATURE: 98.42 F | SYSTOLIC BLOOD PRESSURE: 119 MMHG

## 2018-04-16 VITALS
DIASTOLIC BLOOD PRESSURE: 79 MMHG | TEMPERATURE: 97.88 F | SYSTOLIC BLOOD PRESSURE: 161 MMHG | OXYGEN SATURATION: 97 % | HEART RATE: 61 BPM

## 2018-04-16 VITALS — OXYGEN SATURATION: 97 % | HEART RATE: 58 BPM

## 2018-04-16 VITALS
HEART RATE: 56 BPM | OXYGEN SATURATION: 99 % | DIASTOLIC BLOOD PRESSURE: 82 MMHG | SYSTOLIC BLOOD PRESSURE: 137 MMHG | TEMPERATURE: 97.7 F

## 2018-04-16 VITALS — OXYGEN SATURATION: 97 %

## 2018-04-16 VITALS
OXYGEN SATURATION: 99 % | TEMPERATURE: 98.06 F | SYSTOLIC BLOOD PRESSURE: 143 MMHG | HEART RATE: 52 BPM | DIASTOLIC BLOOD PRESSURE: 78 MMHG

## 2018-04-16 VITALS — OXYGEN SATURATION: 98 %

## 2018-04-16 LAB
BUN SERPL-MCNC: 18 MG/DL (ref 7–18)
CALCIUM SERPL-MCNC: 8.5 MG/DL (ref 8.5–10.1)
CO2 SERPL-SCNC: 26 MMOL/L (ref 21–32)
CREAT SERPL-MCNC: 1.22 MG/DL (ref 0.6–1.4)
GLUCOSE SERPL-MCNC: 82 MG/DL (ref 70–99)
POTASSIUM SERPL-SCNC: 4.8 MMOL/L (ref 3.5–5.1)
PTT PATIENT: 33.6 SECONDS (ref 21–31)
SODIUM SERPL-SCNC: 133 MMOL/L (ref 136–145)

## 2018-04-16 RX ADMIN — APIXABAN SCH MG: 2.5 TABLET, FILM COATED ORAL at 08:32

## 2018-04-16 RX ADMIN — GUAIFENESIN SCH MG: 600 TABLET, EXTENDED RELEASE ORAL at 21:23

## 2018-04-16 RX ADMIN — APIXABAN SCH MG: 2.5 TABLET, FILM COATED ORAL at 21:23

## 2018-04-16 RX ADMIN — Medication SCH MG: at 08:31

## 2018-04-16 RX ADMIN — LEVALBUTEROL HYDROCHLORIDE SCH MG: 1.25 SOLUTION RESPIRATORY (INHALATION) at 07:12

## 2018-04-16 RX ADMIN — GUAIFENESIN SCH MG: 600 TABLET, EXTENDED RELEASE ORAL at 08:31

## 2018-04-16 RX ADMIN — CARVEDILOL SCH MG: 3.12 TABLET, FILM COATED ORAL at 08:32

## 2018-04-16 RX ADMIN — LEVALBUTEROL HYDROCHLORIDE SCH MG: 1.25 SOLUTION RESPIRATORY (INHALATION) at 01:55

## 2018-04-16 RX ADMIN — CARVEDILOL SCH MG: 6.25 TABLET, FILM COATED ORAL at 08:32

## 2018-04-16 RX ADMIN — CARVEDILOL SCH MG: 3.12 TABLET, FILM COATED ORAL at 21:22

## 2018-04-16 NOTE — HOSPITALIST PROGRESS NOTE
Hospitalist Progress Note


Date of Service


Apr 16, 2018.





Subjective


Pt evaluation today including:  conversation w/ patient, conversation w/ family 

(niece at bedside ), physical exam, lab review, review of studies, review of 

inpatient medication list


Voiding:  cabrera catheter in place


Patient resting in bed. Feeling well this AM. Anxious for discharge. 


Eating and drinking OK. 


Niece at bedside- all questions/concerns answered. 


Hoping for [facility] at discharge. 





Patient denies any fever, chills, sweats, lightheadedness, dizziness, vision 

changes, CP, palpitations, edema, SOB, wheezing, cough, abdominal pain, nausea, 

vomiting, diarrhea, urinary symptoms, melena, numbness/tingling, weakness, 

muscle/joint pain, anxiety/depression, active bleeding, or new skin 

discoloration/changes.





Medications





Current Inpatient Medications








 Medications


  (Trade)  Dose


 Ordered  Sig/Hudson


 Route  Start Time


 Stop Time Status Last Admin


Dose Admin


 


 Acetaminophen


  (Tylenol Tab)  650 mg  Q4H  PRN


 PO  4/10/18 19:15


 5/10/18 19:14  4/14/18 15:44


650 MG


 


 Al Hydrox/Mg


 Hydrox/Simethicone


  (Maalox Max Susp)  15 ml  Q4H  PRN


 PO  4/10/18 19:15


 5/10/18 19:14   


 


 


 Magnesium


 Hydroxide


  (Milk Of


 Magnesia Susp)  30 ml  Q12H  PRN


 PO  4/10/18 19:15


 5/10/18 19:14   


 


 


 Ondansetron HCl


  (Zofran Inj)  4 mg  Q6H  PRN


 IV  4/10/18 19:15


 5/10/18 19:14   


 


 


 Nitroglycerin


  (Nitrostat Tab)  0.4 mg  UD  PRN


 SL  4/10/18 19:15


 5/10/18 19:14   


 


 


 Morphine Sulfate


  (MoRPHine


 SULFATE INJ)  2 mg  Q30M  PRN


 IV  4/10/18 19:15


 4/24/18 19:14   


 


 


 Polyethylene


  (Miralax Powder


 Packet)  17 gm  DAILY  PRN


 PO  4/10/18 19:15


 5/10/18 19:14   


 


 


 Albuterol/


 Ipratropium


  (Duoneb)  3 ml  Q4H  PRN


 INH  4/10/18 19:15


 5/10/18 19:14  4/15/18 14:30


3 ML


 


 Hydralazine HCl


  (HydrALAZINE INJ)  10 mg  Q6H  PRN


 IV.  4/10/18 19:15


 5/10/18 19:14   


 


 


 Metoprolol


 Tartrate


  (Lopressor Iv)  5 mg  Q6H  PRN


 IV  4/10/18 19:30


 5/10/18 19:29   


 


 


 Aspirin


  (Aspirin Chew)  81 mg  DAILY


 PO  4/11/18 09:00


 5/11/18 08:59  4/16/18 08:31


81 MG


 


 Levofloxacin 750


 mg/Prmx  150 ml @ 


 100 mls/hr  Q48H


 IV  4/13/18 18:00


 4/18/18 17:59  4/15/18 17:46


100 MLS/HR


 


 Levalbuterol


  (Xopenex 1.25MG/


 3ML Neb)  1.25 mg  Q6R


 INH  4/13/18 11:15


 5/13/18 11:14  4/16/18 07:12


1.25 MG


 


 Guaifenesin


  (Mucinex Contr


 Rel Tab)  1,200 mg  Q12


 PO  4/13/18 21:00


 5/13/18 20:59  4/16/18 08:31


1,200 MG


 


 Carvedilol


  (Coreg Tab)  3.125 mg  BID


 PO  4/14/18 21:00


 5/14/18 20:59  4/16/18 08:32


3.125 MG


 


 Apixaban


  (Eliquis Tab)  5 mg  BID


 PO  4/14/18 21:00


 5/14/18 20:59  4/16/18 08:32


5 MG











Objective


Vital Signs











  Date Time  Temp Pulse Resp B/P (MAP) Pulse Ox O2 Delivery O2 Flow Rate FiO2


 


4/16/18 08:00      Room Air  


 


4/16/18 07:34 36.6 61 18 161/79 (106) 97   


 


4/16/18 07:14  58 16  97 Room Air  


 


4/16/18 04:51 36.5 56 18 137/82 (100) 99 Room Air  


 


4/16/18 04:00     97 Room Air  


 


4/16/18 01:55  58 16  97 Room Air  


 


4/16/18 00:01     98 Room Air  


 


4/15/18 23:38 36.7 60 18 131/69 (89) 94 Room Air  


 


4/15/18 20:00     100 Room Air  


 


4/15/18 20:00     100 Room Air  


 


4/15/18 19:17 37.2 57 18 162/78 (106) 100 Room Air  


 


4/15/18 19:16  56 16  99 Room Air  


 


4/15/18 16:00      Room Air  


 


4/15/18 15:05 37.0 64 16 162/84 (110) 95 Room Air  


 


4/15/18 14:31  53 16  94 Room Air  


 


4/15/18 12:00      Room Air  


 


4/15/18 11:55 36.7 49 16 161/69 (99) 97   











Physical Exam


General Appearance:  no apparent distress


Eyes:  normal inspection, PERRL


ENT:  hearing grossly normal


Neck:  supple


Respiratory/Chest:  no respiratory distress, no accessory muscle use, + crackles

 (bilateral lung bases, mild )


Cardiovascular:  + systolic murmur, + irregularly irregular (rate controlled)


Abdomen:  normal bowel sounds, non tender, soft


Extremities:  no pedal edema, no calf tenderness


Neurologic/Psychiatric:  alert, normal mood/affect, oriented x 3


Skin:  normal color, warm/dry, no rash





Laboratory Results





Last 24 Hours








Test


  4/16/18


06:36


 


Activated Partial


Thromboplast Time 33.6 SECONDS 


 


 


Partial Thromboplastin Ratio 1.3 











Assessment and Plan


Patient is a pleasant 78 y/o male, with PMHx of urinary retention w/ chronic 

Cabrera, who presented to the ED because of a cough. 





CAD, h/o cardiac stenting in ~2010, chronic a.fib- rate controlled:


- Monitoring on tele- a.fib w/ rates controlled, 10-beat run of NSVT on 4/16 @ ~

0300


- Trend cardiac enzymes- peak trop 0.063


- Coreg 3.125 mg BID, Eliquis 5 mg BID, ASA 81 mg daily  


- Overnight sleep study- negative 


- HgbA1c 5.0%, lipid panel reviewed, TSH WNL


- ECHO- EF 35-40%. Mid to apical septum akinesis. Severe inferior hypokinesis. 

Moderate inferolateral hypokinesis. Moderate pulmonary HTN. Grade II diastolic 

dysfunction


- Check PRP and mag level today w/ episode of NSVT overnight- WNL  


- Cardiology consulted, appreciate recommendations- s/p cardiac cath on 4/12- 

severe single vessel disease, aortic valve to be replaced by tertiary care 

center





Severe aortic stenosis: Cardiology arranging referral to tertiary center for 

replacement 





Combined systolic/diastolic CHF- STABLE: 


- Treated w/ IV Lasix


- Monitoring I&Os and daily weights


- ECHO- EF 35-40%, grade II diastolic dysfunction  





Multifocal PNA- IMPROVING: 


- Continue Levaquin 750 mg IV q48h (renal dosing)- last day of treatment on 4/ 17 


- Xopenex QID and PRN for SOB/wheezing 


- BCx NGTD, sputum culture w/ normal nikunj, MRSA swab negative 


- Incentive spirometry, flutter valve, Mucinex


- Recommend 4 week f/u x-ray to ensure resolution 





Lacuna stroke: 


- CT head completed showing a 1.4 cm old lacunar infarct w/in the  L cerebellar 

hemisphere


- Continue ASA and Eliquis, BP control 


- Lipid panel reviewed- no indication for statin therapy 





HTN: 


- Coreg 3.125 mg BID 


- IV Hydralazine PRN 





Mild hyponatremia- STABLE: Continue to monitor, follow PRP 





Urinary retention w/ indwelling Cabrear- noted 





h/o colon cancer s/p resection- noted





DVT prophylaxis: Eliquis BID 





Code status: LEVEL V, DNR 





Dispo: Planning for rehab at discharge ([facility])- stable for discharge pending 

placement- PT/OT and CM following

## 2018-04-16 NOTE — CARDIOLOGY FOLLOW-UP
Subjective


Date of Service:


Apr 16, 2018.


Pt evaluation today including:  conversation w/ patient, conversation w/ family

, physical exam, lab review, review of studies, review of inpatient medication 

list


History of Present Illness


This is a very pleasant 77-year-old gentleman who lives in [place] but has had 

healthcare in other states but is currently visiting his niece here in 

[place].  He has had urologic problems due to an indwelling catheter, but 

has not been seen by cardiology locally.  I do not have good records of his 

prior cardiovascular history, he initially told me that he was not aware of 

having any trouble with his heart however his niece tells me that he was 

evaluated in [place] and was told that he did have some cardiac problems (she 

did not know specifics) but refused evaluation at that time.  Further details 

have emerged and he has had stent placement in the past, which we did not know 

about, evidently in [place] perhaps in 2010.





He describes doing well physically until the last year so when he has had a lot 

of difficulties.  Specifically he has less ability to do activities then he 

could in the past, he really cannot live by himself anymore and he has 

difficulty raising his right arm overhead and his speech has become more 

difficult to understand according to his niece.  All of these things occurred 

over the last year, perhaps 6 months.  He does not have lightheadedness or 

dizziness, he has no awareness of his heart rhythm and he does not have 

exertional chest discomfort that he will admit.  On admission here for urologic 

issues he was noted to be in atrial fibrillation and have some congestive heart 

failure findings on chest x-ray.  He also has significant valvular heart 

disease.  Echocardiography shows significant left ventricular dysfunction and 

severe aortic stenosis.





With uncertainty as to the time course of his cardiac illness we did perform 

catheterization yesterday where he has severe coronary disease but nothing to 

explain his left ventricular dysfunction.  At this point however I do not know 

if some of that might be old, especially in view of having prior stent 

placement.  Our recommendation is to have an evaluation for valve replacement, 

but that cannot be done at this institution.





He continues to do quite well symptomatically, he has had no chest discomfort 

or palpitations.  He is not very active however.  He and his niece are 

arranging for him to stay in the area, hopefully at rehab followed by 

evaluation at [facility] for valve replacement.





Social History


Smoking Status:  Former Smoker


History of Alcohol Use:  No





Review of Systems


Respiratory:  No shortness of breath


Cardiac:  No chest pain


Inability to raise his right arm above his shoulder or use it normally





Medications


Cardiovascular:











Item Value  Date Time


 


Carvedilol 3.125 mg 4/14/18 2100





 (Coreg Tab) BID/PO 4/16/18 0832


 


Apixaban 5 mg 4/14/18 2100





 (Eliquis Tab) BID/PO 4/16/18 0832


 


Aspirin 81 mg 4/11/18 0900





 (Aspirin Chew) DAILY/PO 4/16/18 0831











Objective





Vital Signs Past 12 Hours








  Date Time  Temp Pulse Resp B/P (MAP) Pulse Ox O2 Delivery O2 Flow Rate FiO2


 


4/16/18 08:00      Room Air  


 


4/16/18 07:34 36.6 61 18 161/79 (106) 97   


 


4/16/18 07:14  58 16  97 Room Air  


 


4/16/18 04:51 36.5 56 18 137/82 (100) 99 Room Air  


 


4/16/18 04:00     97 Room Air  


 


4/16/18 01:55  58 16  97 Room Air  


 


4/16/18 00:01     98 Room Air  


 


4/15/18 23:38 36.7 60 18 131/69 (89) 94 Room Air  








Last Recorded Weight-Kilograms:  75.100


Physical Exam


Constitutional:  


   General Apperance:  heathly-appearing


   Level of Distress:  NAD


Lungs:  


   Respiratory effort:  no dyspnea, good air movement


   Auscultation:  no wheezing, rales/crackles on the left, rales/crackles on 

the right


Cardiovascular:  


   Heart Auscultation:  no rubs, no gallops, II/VI MAURICIO, II/VI WSM, irregular 

rate rhythm


Peripheral Pulses:  


   Bruits:  none appreciated


Extremities:  no edema


Right wrist catheterization site looks good





Data


Laboratory Results:





Last 24 Hours








Test


  4/16/18


06:36


 


Activated Partial


Thromboplast Time 33.6 SECONDS 


 


 


Partial Thromboplastin Ratio 1.3 








Telemetry reviewed: Atrial fibrillation with a reasonably well-controlled heart 

rate.  One 10 beat run of nonsustained ventricular tachycardia with a 

decreasing cycle length consistent with an automatic focus.





Assessment and Plan


1.  Aortic stenosis: He has severe aortic stenosis, to the point where he 

likely will need valve replacement.  I do not know how long this has been known

, it evidently was identified in [place] relatively recently, possibly before 

that as we do not have records.  Our recommendation is to have evaluation for 

valve replacement.  He and his family have determined that we can probably do 

that at [facility], they are working on insurance situation.  We 

will tentatively plan on having an evaluation set up, I will make those 

arrangements.





2.  Atrial fibrillation: I do not know how long he has had atrial fibrillation, 

it could be recent or could be long-standing.  His rate is well controlled, in 

fact it is too slow forcing us to reduce his carvedilol.  He was not on 

anticoagulation as an outpatient.  I would recommend continuing Eliquis 5 mg 

twice daily, which she seems to be tolerating well. 





3.  Cardiomyopathy: He has at least a moderate cardiomyopathy with ejection 

fraction 35-40%.  This may be related to his aortic stenosis, however he has 

wall motion abnormalities and conduction abnormalities so some of it could be 

due to ischemic heart disease and we do not have prior records to make that 

determination.  He is not on medical therapy for his cardiomyopathy, his blood 

pressure is elevated and his heart rate was slow on carvedilol 6.25 mg twice 

daily.  I am going to continue 3.125 mg twice daily.





4.  Right arm weakness and dysarthria: His niece reports that his speech is 

much more difficult to understand over the last 6-12 months and he notes 

difficulty using his arm over that same period of time.  Although he attributes 

this to some type of shoulder injury I was concerned that it could have been 

from a stroke.  On CT scanning he did have several lacunar strokes, I do not 

know that this would cause these neurologic findings however.  In any case he 

should be on anticoagulation as well as aspirin for his underlying atrial 

fibrillation and coronary disease and this would be treatment for small vessel 

disease as well.





Thank you for allowing me to participate in his care.

## 2018-04-17 VITALS
DIASTOLIC BLOOD PRESSURE: 73 MMHG | OXYGEN SATURATION: 98 % | HEART RATE: 48 BPM | SYSTOLIC BLOOD PRESSURE: 162 MMHG | TEMPERATURE: 98.24 F

## 2018-04-17 VITALS
TEMPERATURE: 97.88 F | DIASTOLIC BLOOD PRESSURE: 74 MMHG | SYSTOLIC BLOOD PRESSURE: 153 MMHG | OXYGEN SATURATION: 98 % | HEART RATE: 50 BPM

## 2018-04-17 VITALS — OXYGEN SATURATION: 97 %

## 2018-04-17 VITALS
TEMPERATURE: 97.88 F | SYSTOLIC BLOOD PRESSURE: 132 MMHG | OXYGEN SATURATION: 98 % | HEART RATE: 44 BPM | DIASTOLIC BLOOD PRESSURE: 67 MMHG

## 2018-04-17 VITALS
DIASTOLIC BLOOD PRESSURE: 67 MMHG | SYSTOLIC BLOOD PRESSURE: 132 MMHG | OXYGEN SATURATION: 98 % | HEART RATE: 44 BPM | TEMPERATURE: 97.88 F

## 2018-04-17 VITALS
SYSTOLIC BLOOD PRESSURE: 169 MMHG | DIASTOLIC BLOOD PRESSURE: 86 MMHG | OXYGEN SATURATION: 98 % | TEMPERATURE: 98.06 F | HEART RATE: 50 BPM

## 2018-04-17 VITALS — HEART RATE: 63 BPM

## 2018-04-17 LAB
CREAT SERPL-MCNC: 1.2 MG/DL (ref 0.6–1.4)
EOSINOPHIL NFR BLD AUTO: 195 K/UL (ref 130–400)
HCT VFR BLD CALC: 37.1 % (ref 42–52)
HGB BLD-MCNC: 12.1 G/DL (ref 14–18)
MCH RBC QN AUTO: 29.5 PG (ref 25–34)
MCHC RBC AUTO-ENTMCNC: 32.6 G/DL (ref 32–36)
MCV RBC AUTO: 90.5 FL (ref 80–100)
PMV BLD AUTO: 9 FL (ref 7.4–10.4)
PTT PATIENT: 31.9 SECONDS (ref 21–31)
RED CELL DISTRIBUTION WIDTH CV: 13.8 % (ref 11.5–14.5)
RED CELL DISTRIBUTION WIDTH SD: 45.8 FL (ref 36.4–46.3)
WBC # BLD AUTO: 5.15 K/UL (ref 4.8–10.8)

## 2018-04-17 RX ADMIN — CARVEDILOL SCH MG: 3.12 TABLET, FILM COATED ORAL at 08:47

## 2018-04-17 RX ADMIN — APIXABAN SCH MG: 2.5 TABLET, FILM COATED ORAL at 08:46

## 2018-04-17 RX ADMIN — GUAIFENESIN SCH MG: 600 TABLET, EXTENDED RELEASE ORAL at 08:47

## 2018-04-17 RX ADMIN — Medication SCH MG: at 08:46

## 2018-04-17 RX ADMIN — CARVEDILOL SCH MG: 3.12 TABLET, FILM COATED ORAL at 21:09

## 2018-04-17 RX ADMIN — GUAIFENESIN SCH MG: 600 TABLET, EXTENDED RELEASE ORAL at 21:09

## 2018-04-17 RX ADMIN — APIXABAN SCH MG: 2.5 TABLET, FILM COATED ORAL at 21:09

## 2018-04-17 NOTE — CARDIOLOGY FOLLOW-UP
Subjective


Date of Service:


Apr 17, 2018.


Pt evaluation today including:  conversation w/ patient, physical exam, lab 

review, review of studies, review of inpatient medication list


History of Present Illness


This is a very pleasant 77-year-old gentleman who lives in [place] but has had 

healthcare in other states but is currently visiting his niece here in 

[place].  He has had urologic problems due to an indwelling catheter, but 

has not been seen by cardiology locally.  I do not have good records of his 

prior cardiovascular history, he initially told me that he was not aware of 

having any trouble with his heart however his niece tells me that he was 

evaluated in [place] and was told that he did have some cardiac problems (she 

did not know specifics) but refused evaluation at that time.  Further details 

have emerged and he has had stent placement in the past, which we did not know 

about, evidently in [place] perhaps in 2010.





He describes doing well physically until the last year so when he has had a lot 

of difficulties.  Specifically he has less ability to do activities then he 

could in the past, he really cannot live by himself anymore and he has 

difficulty raising his right arm overhead and his speech has become more 

difficult to understand according to his niece.  All of these things occurred 

over the last year, perhaps 6 months.  He does not have lightheadedness or 

dizziness, he has no awareness of his heart rhythm and he does not have 

exertional chest discomfort that he will admit.  On admission here for urologic 

issues he was noted to be in atrial fibrillation and have some congestive heart 

failure findings on chest x-ray.  He also has significant valvular heart 

disease.  Echocardiography shows significant left ventricular dysfunction and 

severe aortic stenosis.





With uncertainty as to the time course of his cardiac illness we did perform 

catheterization yesterday where he has severe coronary disease but nothing to 

explain his left ventricular dysfunction.  At this point however I do not know 

if some of that might be old, especially in view of having prior stent 

placement.  Our recommendation is to have an evaluation for valve replacement, 

but that cannot be done at this institution.





He has been feeling well, he did get quite bradycardic requiring us to decrease 

his carvedilol.  He has no shortness of breath or chest discomfort.





Social History


Smoking Status:  Former Smoker


History of Alcohol Use:  No





Review of Systems


Respiratory:  No shortness of breath


Cardiac:  No chest pain


Inability to raise his right arm above his shoulder or use it normally





Medications


Cardiovascular:











Item Value  Date Time


 


Carvedilol 3.125 mg 4/14/18 2100





 (Coreg Tab) BID/PO 4/17/18 0847


 


Apixaban 5 mg 4/14/18 2100





 (Eliquis Tab) BID/PO 4/17/18 0846


 


Aspirin 81 mg 4/11/18 0900





 (Aspirin Chew) DAILY/PO 4/17/18 0846


 


Metoprolol 5 mg 4/10/18 1930





Tartrate Q6H  PRN/IV 





 (Lopressor Iv)  











Objective





Vital Signs Past 12 Hours








  Date Time  Temp Pulse Resp B/P (MAP) Pulse Ox O2 Delivery O2 Flow Rate FiO2


 


4/17/18 08:44  63      


 


4/17/18 04:20 36.7 50 18 169/86 (113) 98 Room Air  


 


4/17/18 04:00     97 Room Air  


 


4/17/18 00:01     97 Room Air  


 


4/16/18 22:55 36.7 52 18 143/78 (99) 99 Room Air  








Last Recorded Weight-Kilograms:  75.000


Physical Exam


Constitutional:  


   General Apperance:  heathly-appearing


   Level of Distress:  NAD


Lungs:  


   Respiratory effort:  no dyspnea, good air movement


   Auscultation:  no wheezing, rales/crackles on the left, rales/crackles on 

the right


Cardiovascular:  


   Heart Auscultation:  no rubs, no gallops, II/VI MAURICIO, II/VI WSM, irregular 

rate rhythm


Peripheral Pulses:  


   Bruits:  none appreciated


Extremities:  no edema


Right wrist catheterization site looks good





Data


Laboratory Results:





Last 24 Hours








Test


  4/16/18


11:48 4/17/18


06:53


 


Sodium Level 133 mmol/L  


 


Potassium Level 4.8 mmol/L  


 


Chloride Level 104 mmol/L  


 


Carbon Dioxide Level 26 mmol/L  


 


Anion Gap 3.0 mmol/L  


 


Blood Urea Nitrogen 18 mg/dl  


 


Creatinine 1.22 mg/dl  1.20 mg/dl 


 


Est Creatinine Clear Calc


Drug Dose 44.3 ml/min 


  45.0 ml/min 


 


 


Estimated GFR (


American) 65.0 


  66.3 


 


 


Estimated GFR (Non-


American 56.0 


  57.2 


 


 


BUN/Creatinine Ratio 15.1  


 


Random Glucose 82 mg/dl  


 


Calcium Level 8.5 mg/dl  


 


Magnesium Level 2.2 mg/dl  


 


White Blood Count  5.15 K/uL 


 


Red Blood Count  4.10 M/uL 


 


Hemoglobin  12.1 g/dL 


 


Hematocrit  37.1 % 


 


Mean Corpuscular Volume  90.5 fL 


 


Mean Corpuscular Hemoglobin  29.5 pg 


 


Mean Corpuscular Hemoglobin


Concent 


  32.6 g/dl 


 


 


RDW Standard Deviation  45.8 fL 


 


RDW Coefficient of Variation  13.8 % 


 


Platelet Count  195 K/uL 


 


Mean Platelet Volume  9.0 fL 


 


Activated Partial


Thromboplast Time 


  31.9 SECONDS 


 


 


Partial Thromboplastin Ratio  1.2 








Telemetry reviewed: Atrial fibrillation with an overall slow ventricular 

response





Assessment and Plan


1.  Aortic stenosis: He has severe aortic stenosis, to the point where he 

likely will need valve replacement.  I do not know how long this has been known

, it evidently was identified in [place] relatively recently, possibly before 

that as we do not have records.  Our recommendation is to have evaluation for 

valve replacement.  He and his family have determined that we can probably do 

that at [facility], they are working on insurance situation.  We 

will plan on having an evaluation set up, I have made those arrangements 

through [facility] following discharge.





2.  Atrial fibrillation: I do not know how long he has had atrial fibrillation, 

it could be recent or could be long-standing.  His rate is well controlled, in 

fact it is too slow forcing us to reduce his carvedilol.  He was not on 

anticoagulation as an outpatient.  I would recommend continuing Eliquis 5 mg 

twice daily, which she seems to be tolerating well. 





3.  Cardiomyopathy: He has at least a moderate cardiomyopathy with ejection 

fraction 35-40%.  This may be related to his aortic stenosis, however he has 

wall motion abnormalities and conduction abnormalities so some of it could be 

due to ischemic heart disease and we do not have prior records to make that 

determination.  He is not on medical therapy for his cardiomyopathy, his blood 

pressure is elevated and his heart rate was slow on carvedilol 6.25 mg twice 

daily.  I am going to continue 3.125 mg twice daily.





4.  Right arm weakness and dysarthria: His niece reports that his speech is 

much more difficult to understand over the last 6-12 months and he notes 

difficulty using his arm over that same period of time.  Although he attributes 

this to some type of shoulder injury I was concerned that it could have been 

from a stroke.  On CT scanning he did have several lacunar strokes, I do not 

know that this would cause these neurologic findings however.  In any case he 

should be on anticoagulation as well as aspirin for his underlying atrial 

fibrillation and coronary disease and this would be treatment for small vessel 

disease as well.





Thank you for allowing me to participate in his care.

## 2018-04-17 NOTE — HOSPITALIST PROGRESS NOTE
Hospitalist Progress Note


Date of Service


Apr 17, 2018.





Subjective


Pt evaluation today including:  conversation w/ patient, physical exam, lab 

review, review of inpatient medication list


Voiding:  cabrera catheter in place


Patient sitting in bedside chair. Feeling well. Anxious for discharge. 


Eating and drinking OK. 


Denies any complaints/questions/concerns. 


Transfer to med/surg today. 





Patient denies any fever, chills, sweats, lightheadedness, dizziness, vision 

changes, CP, palpitations, edema, SOB, wheezing, cough, abdominal pain, nausea, 

vomiting, diarrhea, urinary symptoms, melena, numbness/tingling, weakness, 

muscle/joint pain, anxiety/depression, active bleeding, or new skin 

discoloration/changes.





Medications





Current Inpatient Medications








 Medications


  (Trade)  Dose


 Ordered  Sig/Hudson


 Route  Start Time


 Stop Time Status Last Admin


Dose Admin


 


 Acetaminophen


  (Tylenol Tab)  650 mg  Q4H  PRN


 PO  4/10/18 19:15


 5/10/18 19:14  4/14/18 15:44


650 MG


 


 Al Hydrox/Mg


 Hydrox/Simethicone


  (Maalox Max Susp)  15 ml  Q4H  PRN


 PO  4/10/18 19:15


 5/10/18 19:14   


 


 


 Magnesium


 Hydroxide


  (Milk Of


 Magnesia Susp)  30 ml  Q12H  PRN


 PO  4/10/18 19:15


 5/10/18 19:14   


 


 


 Ondansetron HCl


  (Zofran Inj)  4 mg  Q6H  PRN


 IV  4/10/18 19:15


 5/10/18 19:14   


 


 


 Nitroglycerin


  (Nitrostat Tab)  0.4 mg  UD  PRN


 SL  4/10/18 19:15


 5/10/18 19:14   


 


 


 Morphine Sulfate


  (MoRPHine


 SULFATE INJ)  2 mg  Q30M  PRN


 IV  4/10/18 19:15


 4/24/18 19:14   


 


 


 Polyethylene


  (Miralax Powder


 Packet)  17 gm  DAILY  PRN


 PO  4/10/18 19:15


 5/10/18 19:14   


 


 


 Albuterol/


 Ipratropium


  (Duoneb)  3 ml  Q4H  PRN


 INH  4/10/18 19:15


 5/10/18 19:14  4/15/18 14:30


3 ML


 


 Hydralazine HCl


  (HydrALAZINE INJ)  10 mg  Q6H  PRN


 IV.  4/10/18 19:15


 5/10/18 19:14   


 


 


 Metoprolol


 Tartrate


  (Lopressor Iv)  5 mg  Q6H  PRN


 IV  4/10/18 19:30


 5/10/18 19:29   


 


 


 Aspirin


  (Aspirin Chew)  81 mg  DAILY


 PO  4/11/18 09:00


 5/11/18 08:59  4/17/18 08:46


81 MG


 


 Guaifenesin


  (Mucinex Contr


 Rel Tab)  1,200 mg  Q12


 PO  4/13/18 21:00


 5/13/18 20:59  4/17/18 08:47


1,200 MG


 


 Carvedilol


  (Coreg Tab)  3.125 mg  BID


 PO  4/14/18 21:00


 5/14/18 20:59  4/17/18 08:47


3.125 MG


 


 Apixaban


  (Eliquis Tab)  5 mg  BID


 PO  4/14/18 21:00


 5/14/18 20:59  4/17/18 08:46


5 MG


 


 Levalbuterol


  (Xopenex 1.25MG/


 3ML Neb)  1.25 mg  Q6R  PRN


 INH  4/16/18 11:30


 5/13/18 11:14   


 


 


 Levofloxacin


  (Levaquin Tab)  750 mg  Q48H


 PO  4/17/18 18:00


 4/17/18 18:01   


 











Objective


Vital Signs











  Date Time  Temp Pulse Resp B/P (MAP) Pulse Ox O2 Delivery O2 Flow Rate FiO2


 


4/17/18 11:20 36.6 44 20 132/67 (88) 98 Room Air  


 


4/17/18 08:44  63      


 


4/17/18 08:00      Room Air  


 


4/17/18 04:20 36.7 50 18 169/86 (113) 98 Room Air  


 


4/17/18 04:00     97 Room Air  


 


4/17/18 00:01     97 Room Air  


 


4/16/18 22:55 36.7 52 18 143/78 (99) 99 Room Air  


 


4/16/18 20:15 36.7 52 20 134/73 (93) 98 Room Air  


 


4/16/18 20:00     100 Room Air  


 


4/16/18 16:19 36.7 52 20 167/81 (109) 100 Room Air  


 


4/16/18 16:00      Room Air  











Physical Exam


General Appearance:  no apparent distress


Eyes:  normal inspection, PERRL


ENT:  hearing grossly normal


Neck:  supple


Respiratory/Chest:  lungs clear, no respiratory distress, no accessory muscle 

use, + decreased breath sounds (throughout )


Cardiovascular:  + bradycardia, + systolic murmur, + irregularly irregular


Abdomen:  normal bowel sounds, non tender, soft


Extremities:  no pedal edema, no calf tenderness


Neurologic/Psychiatric:  alert, normal mood/affect, oriented x 3


Skin:  normal color, warm/dry, no rash





Laboratory Results





Last 24 Hours








Test


  4/17/18


06:53


 


White Blood Count 5.15 K/uL 


 


Red Blood Count 4.10 M/uL 


 


Hemoglobin 12.1 g/dL 


 


Hematocrit 37.1 % 


 


Mean Corpuscular Volume 90.5 fL 


 


Mean Corpuscular Hemoglobin 29.5 pg 


 


Mean Corpuscular Hemoglobin


Concent 32.6 g/dl 


 


 


RDW Standard Deviation 45.8 fL 


 


RDW Coefficient of Variation 13.8 % 


 


Platelet Count 195 K/uL 


 


Mean Platelet Volume 9.0 fL 


 


Activated Partial


Thromboplast Time 31.9 SECONDS 


 


 


Partial Thromboplastin Ratio 1.2 


 


Creatinine 1.20 mg/dl 


 


Est Creatinine Clear Calc


Drug Dose 45.0 ml/min 


 


 


Estimated GFR (


American) 66.3 


 


 


Estimated GFR (Non-


American 57.2 


 











Assessment and Plan


Patient is a pleasant 78 y/o male, with PMHx of urinary retention w/ chronic 

Cabrera, who presented to the ED because of a cough. 





CAD, h/o cardiac stenting in ~2010, chronic a.fib- rate controlled:


- Monitoring on tele- no acute events overnights- transfer to med/surg


- Trend cardiac enzymes- peak trop 0.063


- Coreg 3.125 mg BID, Eliquis 5 mg BID, ASA 81 mg daily  


- Overnight sleep study- negative 


- HgbA1c 5.0%, lipid panel reviewed, TSH WNL


- ECHO- EF 35-40%. Mid to apical septum akinesis. Severe inferior hypokinesis. 

Moderate inferolateral hypokinesis. Moderate pulmonary HTN. Grade II diastolic 

dysfunction


- Cardiology consulted, appreciate recommendations- s/p cardiac cath on 4/12- 

severe single vessel disease, aortic valve to be replaced by tertiary care 

center





Severe aortic stenosis: Cardiology arranging referral to tertiary center ([facility] [place]) 

for replacement 





Combined systolic/diastolic CHF- STABLE: 


- Treated w/ IV Lasix


- Monitoring I&Os and daily weights


- ECHO- EF 35-40%, grade II diastolic dysfunction  





Multifocal PNA- IMPROVING: 


- Continue Levaquin 750 mg IV q48h (renal dosing)- last day of treatment on 4/ 17 


- Xopenex QID and PRN for SOB/wheezing 


- BCx NGTD, sputum culture w/ normal nikunj, MRSA swab negative 


- Incentive spirometry, flutter valve, Mucinex


- Recommend 4 week f/u x-ray to ensure resolution 





[place] stroke: 


- CT head completed showing a 1.4 cm old lacunar infarct w/in the  L cerebellar 

hemisphere


- Continue ASA and Eliquis, BP control 


- Lipid panel reviewed- no indication for statin therapy 





HTN- STABLE: 


- Coreg 3.125 mg BID 


- IV Hydralazine PRN 





Mild hyponatremia- STABLE: Continue to monitor, follow PRP 





Urinary retention w/ indwelling Cabrera- noted 





h/o colon cancer s/p resection- noted





DVT prophylaxis: Eliquis BID 





Code status: LEVEL V, DNR 





Dispo: Stable for discharge pending placement- hoping for [facility]- PT/OT and CM 

following

## 2018-04-18 VITALS
DIASTOLIC BLOOD PRESSURE: 69 MMHG | SYSTOLIC BLOOD PRESSURE: 161 MMHG | OXYGEN SATURATION: 98 % | TEMPERATURE: 97.7 F | HEART RATE: 48 BPM

## 2018-04-18 VITALS — SYSTOLIC BLOOD PRESSURE: 161 MMHG | DIASTOLIC BLOOD PRESSURE: 101 MMHG | HEART RATE: 48 BPM

## 2018-04-18 VITALS — OXYGEN SATURATION: 98 %

## 2018-04-18 VITALS
DIASTOLIC BLOOD PRESSURE: 93 MMHG | HEART RATE: 49 BPM | SYSTOLIC BLOOD PRESSURE: 163 MMHG | TEMPERATURE: 98.06 F | OXYGEN SATURATION: 99 %

## 2018-04-18 VITALS
OXYGEN SATURATION: 100 % | TEMPERATURE: 98.06 F | SYSTOLIC BLOOD PRESSURE: 172 MMHG | DIASTOLIC BLOOD PRESSURE: 84 MMHG | HEART RATE: 55 BPM

## 2018-04-18 LAB — PTT PATIENT: 32 SECONDS (ref 21–31)

## 2018-04-18 RX ADMIN — GUAIFENESIN SCH MG: 600 TABLET, EXTENDED RELEASE ORAL at 21:35

## 2018-04-18 RX ADMIN — CARVEDILOL SCH MG: 3.12 TABLET, FILM COATED ORAL at 21:00

## 2018-04-18 RX ADMIN — Medication SCH MG: at 08:51

## 2018-04-18 RX ADMIN — CARVEDILOL SCH MG: 3.12 TABLET, FILM COATED ORAL at 08:51

## 2018-04-18 RX ADMIN — APIXABAN SCH MG: 2.5 TABLET, FILM COATED ORAL at 21:36

## 2018-04-18 RX ADMIN — APIXABAN SCH MG: 2.5 TABLET, FILM COATED ORAL at 08:51

## 2018-04-18 RX ADMIN — GUAIFENESIN SCH MG: 600 TABLET, EXTENDED RELEASE ORAL at 08:51

## 2018-04-18 NOTE — DISCHARGE SUMMARY
Discharge Summary


Date of Service


2018.





Discharge Summary


Admission Date:


Apr 10, 2018 at 19:19


Discharge Date:  2018


Discharge Disposition:  Rehab


Principal Diagnosis:  A.fib


Problems/Secondary Diagnoses:


CAD


h/o cardiac stenting in ~


chronic a.fib


pulmonary HTN


Severe aortic stenosis


Combined systolic/diastolic CHF


Multifocal PNA


Lacuna stroke


HTN


mild hyponatremia


Urinary retention w/ indwelling Shane


h/o colon cancer s/p resection


Procedures:


TWO VIEW CHEST





CLINICAL HISTORY: Cough.





FINDINGS: AP and lateral chest radiographs are obtained. No prior studies are


available for comparison at the time of dictation. The AP views degraded by


patient rotation. The heart is enlarged. There diffuse bilateral airspace


opacities, greatest in the left upper and right lower lobes. No large pleural


effusion is identified.  There is no pneumothorax. The skeletal structures are


osteopenic. Bony thorax appears intact.





IMPRESSION:





1. Cardiomegaly.





2. There are multifocal bilateral airspace opacities. Is present pulmonary edema


and/or multifocal pneumonia. Clinical and laboratory correlation will be


required. Radiographic follow-up to resolution is recommended.











Electronically signed by:  Duc Momin M.D.


4/10/2018 5:52 PM





Dictated Date/Time:  4/10/2018 5:50 PM





 The status of this report is Signed.   


 Draft = Not yet reviewed or approved by Radiologist.  


Signed = Reviewed and approved by Radiologist





HEAD COMBO





CLINICAL HISTORY: Atrial fibrillation. Right arm weakness.    





COMPARISON STUDY:  No previous studies for comparison.





TECHNIQUE: Axial images of the head were obtained before and after intravenous


administration 94 cc Optiray 320 IV.





FINDINGS: No acute intracranial hemorrhage, midline shift or mass effect is


present. There is moderate atrophy with ventricular and sulcal enlargement. The


basilar cisterns are patent. There are no extra axial collections. No


intracranial mass or pathologic enhancement is present. Note is made of a 1.4 cm


old lacunar infarct within the left cerebellar hemisphere. An 8 mm hypodensity


within left basal ganglia could reflect an old lacunar infarct or prominent


perivascular space. There are no CT findings to suggest acute dural sinus


thrombosis or acute territorial infarct. There is mild mucosal thickening of the


sinuses with a small left maxillary sinus air-fluid level.





IMPRESSION:  





1. No acute intracranial findings.





2. No intracranial mass or pathologic enhancement.





3. Moderate atrophy and moderate small vessel disease.





4. Several old lacunar infarcts.





5. Mild sinus mucosal thickening and a small left maxillary sinus air-fluid


level. 














Electronically signed by:  Tristan Avendano M.D.


2018 4:36 PM





Dictated Date/Time:  2018 4:32 PM





 The status of this report is Signed.   


 Draft = Not yet reviewed or approved by Radiologist.  


Signed = Reviewed and approved by Radiologist





CHEST ONE VIEW PORTABLE





HISTORY:  77 years-old Male assess pneumonia acute atrial fibrillation with


pneumonia





COMPARISON: Chest radiograph 4/10/2018





TECHNIQUE: Portable AP view of the chest





FINDINGS: 


Cardiac silhouette is mildly enlarged, unchanged. Tortuosity of the thoracic


aorta. No pneumothorax. Trace bilateral pleural effusions with persistent patchy


bibasilar opacities. There is slight improvement of the airspace opacities


within the left midlung. Decreased pulmonary vascular congestion. Bones of the


chest appear grossly intact. Degenerative changes are seen within the bilateral


shoulders.





IMPRESSION: 


1. Persistent patchy bibasilar opacities with improved aeration of the left


midlung.


2. Trace bilateral pleural effusions.


3. Cardiomegaly with decreased pulmonary vascular congestion.











The above report was generated using voice recognition software. It may contain


grammatical, syntax or spelling errors.











Electronically signed by:  Danny Chu M.D.


2018 2:33 PM





Dictated Date/Time:  2018 2:30 PM





 The status of this report is Signed.   


 Draft = Not yet reviewed or approved by Radiologist.  


Signed = Reviewed and approved by Radiologist





ECHOCARDIOGRAM:





Interpretation Summary


* Name: BIANKA GOMEZ  Study Date: 2018 07:35 AM  BP: 152/92 mmHg


* MRN: [id number]  Patient Location: [facility]\S\[facility]\S\2  HR: 69


* : 1940 (M/d/yyyy)  Gender: Male  Height: 66 in


* Age: 77 yrs  Ethnicity: [place]  Weight: 164 lb


* Ordering Physician: Tameka Beavers


* Referring Physician: Self, Referred


* Performed By: Deann Damian RCS


* Accession# [id number]  Account# [id number]


* Reason For Study: A-FIB


* BSA: 1.8 m2


* -- Conclusions --


* 1. Normal LV size.  Mild concentric LVH.


* 2. LVEF 35-40%. Mid to apical septum akinetic.  Severe inferior hypokinesis.  

Moderate inferolateral hypokinesis.


* 3. Normal RV size and function.


* 4. Severe calcific aortic stenosis.  Mild to moderate aortic regurgitation.


* 5. Mild mitral regurgitation.


* 6. Moderate pulmonary hypertension.  Est PASP 55-60 mmHg.  Normal RA 

pressures.


* 7. Grade II diastolic dysfunction.


* 8. No prior studies for comparison.


Procedure Details


* A complete two-dimensional transthoracic echocardiogram was performed (2D, M-

mode, Doppler and color flow Doppler).


Left Ventricle


* The left ventricle is grossly normal size.


* There is mild concentric left ventricular hypertrophy.


* Ejection Fraction = 35-40%.


* Mid to apical septum akinetic.  Severe inferior hypokinesis.  Moderate 

inferolateral hypokinesis.


Right Ventricle


* The right ventricle is grossly normal size.


* The right ventricular systolic function is normal as assessed by tricuspid 

annular plane systolic excursion (TAPSE) (normal >1.5 cm).


Atria


* The left atrium is mildly dilated.


* Borderline right atrial enlargement.


* No ASD detected; PFO is not assessed.


Mitral Valve


* Posterior leaflet restricted


* There is mild mitral regurgitation.


Tricuspid Valve


* There is trace tricuspid regurgitation.


* Right ventricular systolic pressure is elevated at 50-60mmHg.


Aortic Valve


* Aortic valve calcified, thickned, restricted


* Severe valvular aortic stenosis.


* Mild to moderate aortic regurgitation.


Pulmonic Valve


* The pulmonary valve is inadequately visualized, but the Doppler data is 

adequate for interpretation.


* There is no pulmonic valvular stenosis.


* Mild pulmonic valvular regurgitation.


Great Vessels


* The aortic root and proximal ascending aorta are normal sized.


Pericardium/Pleural


* There is no pericardial effusion.


Great Vessels


* Normal inferior vena cava size and collapsability with sniff indicates a 

normal right atrial pressure of 3 mmHg


Left Ventricular Diastolic Function


* Diastolic dysfunction, Grade II, consistent with elevated left atrial 

pressure.





Procedure Note


Procedure Date


2018.





Pre-Procedure Diagnosis


Valvular Disease, Cardiomyopathy





AUC Score


7





Post-Procedure Diagnosis


Severe CAD, Normal Intracardiac Pressures





Procedure(s) Performed


Coronary Angiography, Left Heart Cath, Right Heart Cath, Ultrasound Guided 

Vascular Access





Cardiologist


Aung





Assistant(s)


Merry





Estimated Blood Loss


15





Medication(s)


Fentanyl, Heparin, Nicardipine, Nitroglycerin, Versed, Lidocaine 1%





Summary of Findings


Indication:  New cardiomyopathy, severe aortic stenosis


Consultations:


Cardiology- Dr. Goodman





Medication Reconciliation


New Medications:  


Apixaban (Eliquis) 2.5 Mg Tab


5 MG PO BID for 30 Days, #120 TAB





Aspirin (Aspirin Low Strength) 81 Mg Chew


81 MG PO DAILY for 30 Days





Carvedilol (Carvedilol) 3.125 Mg Tab


3.125 MG PO BID for 30 Days, #60 TAB





 


Continued Medications:  


Acetaminophen (Tylenol) 325 Mg Tab


650 MG PO BID PRN for Pain or Fever, TAB











Discharge Exam


Review of Systems:  


   Constitutional:  No fever, No chills, No sweats, No weakness, No fatigue


   Eyes:  No worsening of vision


   ENT:  No hearing loss


   Respiratory:  No cough, No shortness of breath, No hemoptysis


   Cardiovascular:  No chest pain, No edema, No palpitations


   Abdomen:  No pain, No nausea, No vomiting, No diarrhea, No constipation


   Musculoskeletal:  No joint pain, No muscle pain, No swelling, No calf pain


   Genitourinary - Male:  No hematuria


   Neurologic:  No weakness, No numbness/tingling


   Psychiatric:  No depression symptoms, No anxiety


   Endocrine:  No fatigue


   Hematologic / Lymphatic:  No abnormal bleeding/bruising


   Integumentary:  No rash, No itch, No new/changing skin lesions


Physical Exam:  


   General Appearance:  no apparent distress


   Eyes:  normal inspection, PERRL


   ENT:  hearing grossly normal


   Neck:  supple


   Respiratory/Chest:  no respiratory distress, no accessory muscle use, + 

decreased breath sounds (throughout )


   Cardiovascular:  + systolic murmur, + irregularly irregular (rate controlled)


   Abdomen / GI:  normal bowel sounds, non tender, soft


   Extremities:  no calf tenderness, no pedal edema


   Neurologic/Psychiatric:  alert, normal mood/affect, oriented x 3


   Skin:  normal color, warm/dry, no rash





Hospital Course


Patient is a pleasant 76 y/o male, with PMHx of urinary retention w/ chronic 

Shane, who presented to the ED because of a cough. 





CAD, h/o cardiac stenting in ~, chronic a.fib- rate controlled:


- Monitoring on tele- no acute events overnights- transfer to med/surg


- Trend cardiac enzymes- peak trop 0.063


- Coreg 3.125 mg BID, Eliquis 5 mg BID, ASA 81 mg daily  


- Overnight sleep study- negative 


- HgbA1c 5.0%, lipid panel reviewed, TSH WNL


- ECHO- EF 35-40%. Mid to apical septum akinesis. Severe inferior hypokinesis. 

Moderate inferolateral hypokinesis. Moderate pulmonary HTN. Grade II diastolic 

dysfunction


- Cardiology consulted, appreciate recommendations- s/p cardiac cath on - 

severe single vessel disease, aortic valve to be replaced by tertiary care 

center





Severe aortic stenosis: Cardiology arranging referral to tertiary center ([facility] [place]) 

for replacement 





Combined systolic/diastolic CHF- STABLE: 


- Treated w/ IV Lasix


- Monitoring I&Os and daily weights


- ECHO- EF 35-40%, grade II diastolic dysfunction  





Multifocal PNA- RESOLVED: 


- Continue Levaquin 750 mg IV q48h (renal dosing)- last day of treatment on  


- Xopenex QID and PRN for SOB/wheezing 


- BCx NGTD, sputum culture w/ normal nikunj, MRSA swab negative 


- Incentive spirometry, flutter valve, Mucinex


- Recommend 4 week f/u x-ray to ensure resolution 





Lacuna stroke: 


- CT head completed showing a 1.4 cm old lacunar infarct w/in the  L cerebellar 

hemisphere


- Continue ASA and Eliquis, BP control 


- Lipid panel reviewed- no indication for statin therapy 





HTN- STABLE: 


- Coreg 3.125 mg BID 


- IV Hydralazine PRN 





Mild hyponatremia- STABLE: Continue to monitor, follow PRP 





Urinary retention w/ indwelling Shane- noted 





h/o colon cancer s/p resection- noted





DVT prophylaxis: Eliquis BID 





Code status: LEVEL V, DNR 





Dispo: Discharge to [facility]


Total Time Spent:  Greater than 30 minutes


This includes examination of the patient, discharge planning, medication 

reconciliation, and communication with other providers.





Discharge Instructions


Please refer to the electronic Patient Visit Report (Discharge Instructions) 

for additional information.





Follow-Up


Follow-up with [facility] provider within 24-48 hours 





Please follow-up with your PCP within 5-7 days after discharge from [facility]





Please follow-up with Cardiology/[facility] as instructed by Dr. Goodman 





Please follow-up/keep all of your subspecialty appointments





Additional Copies To


Mirela Escoto

## 2018-04-18 NOTE — HOSPITALIST PROGRESS NOTE
Hospitalist Progress Note


Date of Service


Apr 18, 2018.





Subjective


Pt evaluation today including:  conversation w/ patient, physical exam, chart 

review, review of inpatient medication list


Voiding:  cabrera catheter in place


Patient resting in bed. 


Eating and drinking OK. 


No new issues. 


Waiting for placement. 





Patient denies any fever, chills, sweats, lightheadedness, dizziness, vision 

changes, CP, palpitations, edema, SOB, wheezing, cough, abdominal pain, nausea, 

vomiting, diarrhea, urinary symptoms, melena, numbness/tingling, weakness, 

muscle/joint pain, anxiety/depression, active bleeding, or new skin 

discoloration/changes.





Medications





Current Inpatient Medications








 Medications


  (Trade)  Dose


 Ordered  Sig/Hudson


 Route  Start Time


 Stop Time Status Last Admin


Dose Admin


 


 Acetaminophen


  (Tylenol Tab)  650 mg  Q4H  PRN


 PO  4/10/18 19:15


 5/10/18 19:14  4/14/18 15:44


650 MG


 


 Al Hydrox/Mg


 Hydrox/Simethicone


  (Maalox Max Susp)  15 ml  Q4H  PRN


 PO  4/10/18 19:15


 5/10/18 19:14   


 


 


 Magnesium


 Hydroxide


  (Milk Of


 Magnesia Susp)  30 ml  Q12H  PRN


 PO  4/10/18 19:15


 5/10/18 19:14   


 


 


 Ondansetron HCl


  (Zofran Inj)  4 mg  Q6H  PRN


 IV  4/10/18 19:15


 5/10/18 19:14   


 


 


 Nitroglycerin


  (Nitrostat Tab)  0.4 mg  UD  PRN


 SL  4/10/18 19:15


 5/10/18 19:14   


 


 


 Morphine Sulfate


  (MoRPHine


 SULFATE INJ)  2 mg  Q30M  PRN


 IV  4/10/18 19:15


 4/24/18 19:14   


 


 


 Polyethylene


  (Miralax Powder


 Packet)  17 gm  DAILY  PRN


 PO  4/10/18 19:15


 5/10/18 19:14   


 


 


 Albuterol/


 Ipratropium


  (Duoneb)  3 ml  Q4H  PRN


 INH  4/10/18 19:15


 5/10/18 19:14  4/15/18 14:30


3 ML


 


 Hydralazine HCl


  (HydrALAZINE INJ)  10 mg  Q6H  PRN


 IV.  4/10/18 19:15


 5/10/18 19:14   


 


 


 Metoprolol


 Tartrate


  (Lopressor Iv)  5 mg  Q6H  PRN


 IV  4/10/18 19:30


 5/10/18 19:29   


 


 


 Aspirin


  (Aspirin Chew)  81 mg  DAILY


 PO  4/11/18 09:00


 5/11/18 08:59  4/18/18 08:51


81 MG


 


 Guaifenesin


  (Mucinex Contr


 Rel Tab)  1,200 mg  Q12


 PO  4/13/18 21:00


 5/13/18 20:59  4/18/18 08:51


1,200 MG


 


 Carvedilol


  (Coreg Tab)  3.125 mg  BID


 PO  4/14/18 21:00


 5/14/18 20:59  4/18/18 08:51


3.125 MG


 


 Apixaban


  (Eliquis Tab)  5 mg  BID


 PO  4/14/18 21:00


 5/14/18 20:59  4/18/18 08:51


5 MG


 


 Levalbuterol


  (Xopenex 1.25MG/


 3ML Neb)  1.25 mg  Q6R  PRN


 INH  4/16/18 11:30


 5/13/18 11:14   


 











Objective


Vital Signs











  Date Time  Temp Pulse Resp B/P (MAP) Pulse Ox O2 Delivery O2 Flow Rate FiO2


 


4/18/18 08:15     98 Room Air  


 


4/18/18 07:11 36.5 48 20 161/69 (99) 98 Room Air  


 


4/18/18 00:00      Room Air  


 


4/17/18 23:42 36.6 50 18 153/74 (100) 98 Room Air  


 


4/17/18 16:00      Room Air  


 


4/17/18 14:45 36.8 48 20 162/73 (102) 98 Room Air  











Physical Exam


General Appearance:  no apparent distress


Eyes:  normal inspection, PERRL


ENT:  hearing grossly normal


Neck:  supple


Respiratory/Chest:  no respiratory distress, no accessory muscle use, + 

decreased breath sounds (throughout)


Cardiovascular:  + systolic murmur, + irregularly irregular (rate controlled)


Abdomen:  normal bowel sounds, non tender, soft


Extremities:  no pedal edema, no calf tenderness


Neurologic/Psychiatric:  alert, normal mood/affect, oriented x 3


Skin:  normal color, warm/dry, no rash





Laboratory Results





Last 24 Hours








Test


  4/18/18


06:59


 


Activated Partial


Thromboplast Time 32.0 SECONDS 


 


 


Partial Thromboplastin Ratio 1.2 











Assessment and Plan


Patient is a pleasant 78 y/o male, with PMHx of urinary retention w/ chronic 

Cabrera, who presented to the ED because of a cough. 





CAD, h/o cardiac stenting in ~2010, chronic a.fib- rate controlled:


- Monitoring on tele- no acute events overnights- transfer to med/surg


- Trend cardiac enzymes- peak trop 0.063


- Coreg 3.125 mg BID, Eliquis 5 mg BID, ASA 81 mg daily  


- Overnight sleep study- negative 


- HgbA1c 5.0%, lipid panel reviewed, TSH WNL


- ECHO- EF 35-40%. Mid to apical septum akinesis. Severe inferior hypokinesis. 

Moderate inferolateral hypokinesis. Moderate pulmonary HTN. Grade II diastolic 

dysfunction


- Cardiology consulted, appreciate recommendations- s/p cardiac cath on 4/12- 

severe single vessel disease, aortic valve to be replaced by tertiary care 

center





Severe aortic stenosis: Cardiology arranging referral to tertiary center ([facility] [place]) 

for replacement 





Combined systolic/diastolic CHF- STABLE: 


- Treated w/ IV Lasix


- Monitoring I&Os and daily weights


- ECHO- EF 35-40%, grade II diastolic dysfunction  





Multifocal PNA- RESOLVED: 


- Continue Levaquin 750 mg IV q48h (renal dosing)- last day of treatment on 4/ 17 


- Xopenex QID and PRN for SOB/wheezing 


- BCx NGTD, sputum culture w/ normal nikunj, MRSA swab negative 


- Incentive spirometry, flutter valve, Mucinex


- Recommend 4 week f/u x-ray to ensure resolution 





Lacuna stroke: 


- CT head completed showing a 1.4 cm old lacunar infarct w/in the L cerebellar 

hemisphere


- Continue ASA and Eliquis, BP control 


- Lipid panel reviewed- no indication for statin therapy 





HTN- STABLE: 


- Coreg 3.125 mg BID 


- IV Hydralazine PRN 





Mild hyponatremia- STABLE: Continue to monitor, follow PRP 





Urinary retention w/ indwelling Cabrera- noted 





h/o colon cancer s/p resection- noted





DVT prophylaxis: Eliquis BID 





Code status: LEVEL V, DNR 





Dispo: Stable for discharge pending placement- PT/OT and CM following

## 2018-04-18 NOTE — DISCHARGE INSTRUCTIONS
Discharge Instructions


Date of Service


Apr 18, 2018.





Admission


Reason for Admission:  A-Fib, Pna





Discharge


Discharge Diagnosis / Problem:  PNA, a.fib. CHF





Discharge Goals


Goal(s):  Decrease discomfort, Improve function, Increase independence, Improve 

disease control, Learn about illness, Diagnostic testing, Therapeutic 

intervention, Prevent Disease Progression





Activity Recommendations


Activity Level:  Assistance Required


Therapies:  Physical Therapy, Occupational Therapy





.





Additional Information


Patient informed of condition:  Yes


Advance Directives:  No


DNR:  Yes


Level of Care:  Acute Rehab


Communicable Disease:  No


Prognosis:  Stable


Shane Catheter:  Yes





Instructions / Follow-Up


Instructions / Follow-Up


CAD, h/o cardiac stenting in ~2010, chronic a.fib- rate controlled: Coreg 3.125 

mg BID, Eliquis 5 mg BID, ASA 81 mg daily 





Severe aortic stenosis: Cardiology arranging referral to tertiary center ([facility] [place]) 

for replacement 





Combined systolic/diastolic CHF- STABLE





Multifocal PNA- RESOLVED: 


- Treatment completed 


- Recommend 4 week (~5/8) f/u x-ray to ensure resolution 





L[place]una stroke: 


- Continue ASA and Eliquis, BP control 


- Lipid panel reviewed- no indication for statin therapy 





HTN: Coreg 3.125 mg BID 





Mild hyponatremia- STABLE





Urinary retention w/ indwelling Shane- noted 





h/o colon cancer s/p resection- noted





DVT prophylaxis: Eliquis BID 





Code status: LEVEL V, DNR 





Dispo: Discharge to [facility]





FOLLOW-UPS:





Follow-up with [facility] provider within 24-48 hrs 





Please follow-up with your PCP within 5-7 days after discharge from [facility]





Please follow-up with Cardiology/[facility] as instructed by Dr. Goodman 





Please follow-up/keep all of your subspecialty appointments





Current Hospital Diet


Patient's current hospital diet: AHA Diet (Heart Healthy)





Discharge Diet


Recommended Diet:  AHA Diet (Heart Healthy), Low Sodium Diet (2gm Na)





Pending Studies


Studies pending at discharge:  no





Physician Orders On Transfer


Special Precautions:


Fall precautions


Dressing Changes:


None


IV Therapy:


None


Vital Signs:


Routine


POLST Discussion:  without POLST completion





Laboratory Results





Hemoglobin A1c








Test


  4/11/18


03:30 Range/Units


 


 


Estimated Average Glucose 97   mg/dl


 


Hemoglobin A1c 5.0  4.5-5.6  %








Lipid Panel








Test


  4/11/18


03:30 Range/Units


 


 


Triglycerides Level 49  0-150  mg/dl


 


Cholesterol Level 101  0-200  mg/dl


 


HDL Cholesterol 58   mg/dl


 


Cholesterol/HDL Ratio 1.7   


 


LDL Cholesterol, Calculated 33   mg/dl











Medical Emergencies








.


Who to Call and When:





Medical Emergencies:  If at any time you feel your situation is an emergency, 

please call 911 immediately.





.





Non-Emergent Contact


Non-Emergency issues call your:  Primary Care Provider, Cardiologist


Call Non-Emergent contact if:  you have any medication questions





.


.








"Provider Documentation" section prepared by Tameka Beavers.








.





Core Measure Problem


Core Measures:  None

## 2018-04-19 VITALS — OXYGEN SATURATION: 98 %

## 2018-04-19 VITALS
SYSTOLIC BLOOD PRESSURE: 170 MMHG | TEMPERATURE: 97.52 F | OXYGEN SATURATION: 99 % | DIASTOLIC BLOOD PRESSURE: 82 MMHG | HEART RATE: 61 BPM

## 2018-04-19 VITALS — HEART RATE: 68 BPM | SYSTOLIC BLOOD PRESSURE: 169 MMHG | DIASTOLIC BLOOD PRESSURE: 77 MMHG

## 2018-04-19 VITALS
DIASTOLIC BLOOD PRESSURE: 72 MMHG | SYSTOLIC BLOOD PRESSURE: 150 MMHG | HEART RATE: 55 BPM | OXYGEN SATURATION: 97 % | TEMPERATURE: 97.34 F

## 2018-04-19 VITALS
OXYGEN SATURATION: 99 % | SYSTOLIC BLOOD PRESSURE: 170 MMHG | HEART RATE: 61 BPM | TEMPERATURE: 97.52 F | DIASTOLIC BLOOD PRESSURE: 82 MMHG

## 2018-04-19 VITALS — HEART RATE: 60 BPM

## 2018-04-19 LAB
CREAT SERPL-MCNC: 1.23 MG/DL (ref 0.6–1.4)
EOSINOPHIL NFR BLD AUTO: 215 K/UL (ref 130–400)
HCT VFR BLD CALC: 37.4 % (ref 42–52)
HGB BLD-MCNC: 12.5 G/DL (ref 14–18)
MCH RBC QN AUTO: 29.8 PG (ref 25–34)
MCHC RBC AUTO-ENTMCNC: 33.4 G/DL (ref 32–36)
MCV RBC AUTO: 89.3 FL (ref 80–100)
PMV BLD AUTO: 9 FL (ref 7.4–10.4)
PTT PATIENT: 32.1 SECONDS (ref 21–31)
RED CELL DISTRIBUTION WIDTH CV: 13.8 % (ref 11.5–14.5)
RED CELL DISTRIBUTION WIDTH SD: 45.4 FL (ref 36.4–46.3)
WBC # BLD AUTO: 5.69 K/UL (ref 4.8–10.8)

## 2018-04-19 RX ADMIN — GUAIFENESIN SCH MG: 600 TABLET, EXTENDED RELEASE ORAL at 08:05

## 2018-04-19 RX ADMIN — CARVEDILOL SCH MG: 3.12 TABLET, FILM COATED ORAL at 08:04

## 2018-04-19 RX ADMIN — Medication SCH MG: at 08:07

## 2018-04-19 RX ADMIN — APIXABAN SCH MG: 2.5 TABLET, FILM COATED ORAL at 08:04

## 2018-04-19 NOTE — DISCHARGE INSTRUCTIONS
Discharge Instructions


Date of Service


Apr 19, 2018.





Admission


Reason for Admission:  A-Fib, Pna





Discharge


Discharge Diagnosis / Problem:  atrail fibrillation, congestive heart failure, 

pneumonia





Discharge Goals


Goal(s):  Decrease discomfort, Improve function, Increase independence, Improve 

disease control, Learn about illness, Diagnostic testing, Therapeutic 

intervention, Prevent Disease Progression





Activity Recommendations


Activity Limitations:  resume your previous activity





.





Instructions / Follow-Up


Instructions / Follow-Up


New medications: 


   Coreg 3.125 mg twice a day


   Eliquis 5 mg twice a day 


   Aspirin 81 mg daily 





Congestive heart failure instructions:





Call your Primary Care doctor if any of the following symptoms or problems 

start or get worse:





* Shortness of breath or difficulty breathing


* Wake up at night short of breath


* Chest pain


* Cough


* Swelling of your hands, feet, or legs


* More fatigued or tired with your normal activity


* Palpitations - sudden fast heart beats





WEIGHT





* Weigh yourself every morning after using the bathroom.


* Use the same scale.


* Wear the same amount of clothing.


* Write your weight down on a chart.


* Call your Primary Care doctor if you gain more than 2-3 pounds in 1-2 days.





MEDICATIONS





* Use this discharge instruction sheet for medication instructions.


* Take your medications at the time your doctor ordered.


* Do not skip a dose of your medicines.


* If you miss a dose of medicine, take it as soon as possible, but DO NOT 

DOUBLE A DOSE.


* Read your medicine information when you get home.


* Know all of the side effects of your medicine.  If in doubt, ask your 

pharmacist


* Call your Primary Care doctor's office if you have any side effects.


* Be sure all of your doctors know what medicine and herbs you take (including 

cold, flu, and herbal medicine).








Take the following with you to your follow-up doctor appointments:





* Weight Chart


* Medication List


* List of questions





Do not drink excessive alcohol, beer or wine.





FOLLOW-UPS:





Please follow-up with your PCP within 5-7 days 





Please follow-up with Cardiology/[facility] as instructed by Dr. Goodman 





Please follow-up/keep all of your subspecialty appointments





Current Hospital Diet


Patient's current hospital diet: AHA Diet (Heart Healthy)





Discharge Diet


Recommended Diet:  AHA Diet (Heart Healthy)





Pending Studies


Studies pending at discharge:  no





Laboratory Results





Hemoglobin A1c








Test


  4/11/18


03:30 Range/Units


 


 


Estimated Average Glucose 97   mg/dl


 


Hemoglobin A1c 5.0  4.5-5.6  %








Lipid Panel








Test


  4/11/18


03:30 Range/Units


 


 


Triglycerides Level 49  0-150  mg/dl


 


Cholesterol Level 101  0-200  mg/dl


 


HDL Cholesterol 58   mg/dl


 


Cholesterol/HDL Ratio 1.7   


 


LDL Cholesterol, Calculated 33   mg/dl











Medical Emergencies








.


Who to Call and When:





Call 911 or go to the Emergency Room if:





* If at any time you feel your situation is an emergency


* You have tightness or pain in your chest that does not go away with rest or 

Nitroglycerin


* You are very short of breath even with rest





.





Non-Emergent Contact


Non-Emergency issues call your:  Primary Care Provider, Cardiologist


Call Non-Emergent contact if:  you have any medication questions





.


.








"Provider Documentation" section prepared by Tameka Beavers.








.

## 2018-04-19 NOTE — HOSPITALIST PROGRESS NOTE
Hospitalist Progress Note


Date of Service


Apr 19, 2018.





Subjective


Pt evaluation today including:  conversation w/ patient, physical exam, lab 

review, review of inpatient medication list


Voiding:  cabrera catheter in place


Patient resting in bed. Feeling well. 


Anxious for discharge. 


Eating and drinking OK. 


No new issues. 





Patient denies any fever, chills, sweats, lightheadedness, dizziness, vision 

changes, CP, palpitations, edema, SOB, wheezing, cough, abdominal pain, nausea, 

vomiting, diarrhea, urinary symptoms, melena, numbness/tingling, weakness, 

muscle/joint pain, anxiety/depression, active bleeding, or new skin 

discoloration/changes.





Medications





Current Inpatient Medications








 Medications


  (Trade)  Dose


 Ordered  Sig/Hudson


 Route  Start Time


 Stop Time Status Last Admin


Dose Admin


 


 Acetaminophen


  (Tylenol Tab)  650 mg  Q4H  PRN


 PO  4/10/18 19:15


 5/10/18 19:14  4/14/18 15:44


650 MG


 


 Al Hydrox/Mg


 Hydrox/Simethicone


  (Maalox Max Susp)  15 ml  Q4H  PRN


 PO  4/10/18 19:15


 5/10/18 19:14   


 


 


 Magnesium


 Hydroxide


  (Milk Of


 Magnesia Susp)  30 ml  Q12H  PRN


 PO  4/10/18 19:15


 5/10/18 19:14   


 


 


 Ondansetron HCl


  (Zofran Inj)  4 mg  Q6H  PRN


 IV  4/10/18 19:15


 5/10/18 19:14   


 


 


 Nitroglycerin


  (Nitrostat Tab)  0.4 mg  UD  PRN


 SL  4/10/18 19:15


 5/10/18 19:14   


 


 


 Morphine Sulfate


  (MoRPHine


 SULFATE INJ)  2 mg  Q30M  PRN


 IV  4/10/18 19:15


 4/24/18 19:14   


 


 


 Polyethylene


  (Miralax Powder


 Packet)  17 gm  DAILY  PRN


 PO  4/10/18 19:15


 5/10/18 19:14   


 


 


 Albuterol/


 Ipratropium


  (Duoneb)  3 ml  Q4H  PRN


 INH  4/10/18 19:15


 5/10/18 19:14  4/15/18 14:30


3 ML


 


 Hydralazine HCl


  (HydrALAZINE INJ)  10 mg  Q6H  PRN


 IV.  4/10/18 19:15


 5/10/18 19:14   


 


 


 Metoprolol


 Tartrate


  (Lopressor Iv)  5 mg  Q6H  PRN


 IV  4/10/18 19:30


 5/10/18 19:29   


 


 


 Aspirin


  (Aspirin Chew)  81 mg  DAILY


 PO  4/11/18 09:00


 5/11/18 08:59  4/19/18 08:07


81 MG


 


 Guaifenesin


  (Mucinex Contr


 Rel Tab)  1,200 mg  Q12


 PO  4/13/18 21:00


 5/13/18 20:59  4/19/18 08:05


1,200 MG


 


 Carvedilol


  (Coreg Tab)  3.125 mg  BID


 PO  4/14/18 21:00


 5/14/18 20:59  4/19/18 08:04


3.125 MG


 


 Apixaban


  (Eliquis Tab)  5 mg  BID


 PO  4/14/18 21:00


 5/14/18 20:59  4/19/18 08:04


5 MG


 


 Levalbuterol


  (Xopenex 1.25MG/


 3ML Neb)  1.25 mg  Q6R  PRN


 INH  4/16/18 11:30


 5/13/18 11:14   


 











Objective


Vital Signs











  Date Time  Temp Pulse Resp B/P (MAP) Pulse Ox O2 Delivery O2 Flow Rate FiO2


 


4/19/18 08:11  60      


 


4/19/18 07:56 36.3 55 19 150/72 (98) 97 Room Air  


 


4/19/18 07:47     98 Room Air  


 


4/19/18 04:48  68  169/77 (107)    


 


4/19/18 00:00      Room Air  


 


4/18/18 23:00 36.7 49 20 163/93 (116) 99 Room Air  


 


4/18/18 21:34  48  161/101 (121)    


 


4/18/18 16:00      Room Air  


 


4/18/18 15:27 36.7 55 16 172/84 (113) 100 Room Air  











Physical Exam


General Appearance:  no apparent distress


Eyes:  normal inspection, PERRL


ENT:  hearing grossly normal


Neck:  supple


Respiratory/Chest:  no respiratory distress, no accessory muscle use, + 

decreased breath sounds (throughout )


Cardiovascular:  + systolic murmur, + irregularly irregular (controlled rate )


Abdomen:  normal bowel sounds, non tender, soft


Extremities:  no pedal edema, no calf tenderness


Neurologic/Psychiatric:  alert, normal mood/affect, oriented x 3


Skin:  normal color, warm/dry, no rash





Laboratory Results





Last 24 Hours








Test


  4/19/18


06:22


 


White Blood Count 5.69 K/uL 


 


Red Blood Count 4.19 M/uL 


 


Hemoglobin 12.5 g/dL 


 


Hematocrit 37.4 % 


 


Mean Corpuscular Volume 89.3 fL 


 


Mean Corpuscular Hemoglobin 29.8 pg 


 


Mean Corpuscular Hemoglobin


Concent 33.4 g/dl 


 


 


RDW Standard Deviation 45.4 fL 


 


RDW Coefficient of Variation 13.8 % 


 


Platelet Count 215 K/uL 


 


Mean Platelet Volume 9.0 fL 


 


Activated Partial


Thromboplast Time 32.1 SECONDS 


 


 


Partial Thromboplastin Ratio 1.2 


 


Creatinine 1.23 mg/dl 


 


Est Creatinine Clear Calc


Drug Dose 43.9 ml/min 


 


 


Estimated GFR (


American) 64.3 


 


 


Estimated GFR (Non-


American 55.5 


 











Assessment and Plan


Patient is a pleasant 78 y/o male, with PMHx of urinary retention w/ chronic 

Cabrera, who presented to the ED because of a cough. 





CAD, h/o cardiac stenting in ~2010, chronic a.fib- rate controlled:


- Monitoring on tele- no acute events overnights- transferred to med/surg


- Trend cardiac enzymes- peak trop 0.063


- Coreg 3.125 mg BID, Eliquis 5 mg BID, ASA 81 mg daily  


- Overnight sleep study- negative 


- HgbA1c 5.0%, lipid panel reviewed, TSH WNL


- ECHO- EF 35-40%. Mid to apical septum akinesis. Severe inferior hypokinesis. 

Moderate inferolateral hypokinesis. Moderate pulmonary HTN. Grade II diastolic 

dysfunction


- Cardiology consulted, appreciate recommendations- s/p cardiac cath on 4/12- 

severe single vessel disease, aortic valve to be replaced by tertiary care 

center





Severe aortic stenosis: Cardiology arranging referral to tertiary center ([facility] [place]) 

for replacement 





Combined systolic/diastolic CHF- STABLE: 


- Treated w/ IV Lasix


- Monitoring I&Os and daily weights


- ECHO- EF 35-40%, grade II diastolic dysfunction  





Multifocal PNA- RESOLVED: 


- Continue Levaquin 750 mg IV q48h (renal dosing)- last day of treatment on 4/ 17 


- Xopenex QID and PRN for SOB/wheezing 


- BCx NGTD, sputum culture w/ normal nikunj, MRSA swab negative 


- Incentive spirometry, flutter valve, Mucinex


- Recommend 4 week f/u x-ray to ensure resolution 





[place] stroke: 


- CT head completed showing a 1.4 cm old lacunar infarct w/in the L cerebellar 

hemisphere


- Continue ASA and Eliquis, BP control 


- Lipid panel reviewed- no indication for statin therapy 





HTN- STABLE: 


- Coreg 3.125 mg BID 


- IV Hydralazine PRN 





Mild hyponatremia- STABLE: Continue to monitor, follow PRP 





Urinary retention w/ indwelling Cabrera- noted 





h/o colon cancer s/p resection- noted





DVT prophylaxis: Eliquis BID 





Code status: LEVEL V, DNR 





Dispo: Stable for discharge pending placement- denied [facility] [place] is 

next option- PT/OT and CM following

## 2018-05-06 ENCOUNTER — HOSPITAL ENCOUNTER (EMERGENCY)
Dept: HOSPITAL 45 - C.EDB | Age: 78
Discharge: HOME | End: 2018-05-06
Payer: COMMERCIAL

## 2018-05-06 VITALS
WEIGHT: 165.35 LBS | BODY MASS INDEX: 26.57 KG/M2 | HEIGHT: 65.98 IN | BODY MASS INDEX: 26.57 KG/M2 | WEIGHT: 165.35 LBS | HEIGHT: 65.98 IN

## 2018-05-06 VITALS — SYSTOLIC BLOOD PRESSURE: 179 MMHG | HEART RATE: 58 BPM | DIASTOLIC BLOOD PRESSURE: 92 MMHG | OXYGEN SATURATION: 100 %

## 2018-05-06 VITALS — TEMPERATURE: 98.06 F

## 2018-05-06 DIAGNOSIS — I10: ICD-10-CM

## 2018-05-06 DIAGNOSIS — Z79.01: ICD-10-CM
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
## 2018-05-06 DIAGNOSIS — Z23: ICD-10-CM

## 2018-05-06 DIAGNOSIS — S41.111A: Primary | ICD-10-CM

## 2018-05-06 DIAGNOSIS — Z79.899: ICD-10-CM

## 2018-05-06 DIAGNOSIS — F17.210: ICD-10-CM

## 2018-05-06 DIAGNOSIS — Z79.82: ICD-10-CM

## 2018-05-06 DIAGNOSIS — S51.811A: ICD-10-CM

## 2018-05-06 NOTE — EMERGENCY ROOM VISIT NOTE
ED Visit Note


First contact with patient:  15:15


I have personally evaluated this patient examined her and reviewed the 

pertinent labs and data. I have discussed the case with Laura Matos, the nurse 

practitioner and agree with the plan. Please refer to the PA note





This patient suffered a mechanical fall and tripped against a barbed wire and 

has multiple superficial skin tears on his right arm.  There is nothing require 

suturing.  He has on a blood thinner however he has no significant active 

bleeding.  On my exam, he is neurologically neurovascular intact.  We did 

update him on his tetanus booster the wounds were cleansed and dressed.  I 

talked to the patient and his son at length and told him he needs to return if: 

he has worsening of symptoms, increasing redness or warmth, any new problems or 

concerns.

## 2018-05-06 NOTE — EMERGENCY ROOM VISIT NOTE
ED Visit Note


First contact with patient:  15:15


CHIEF COMPLAINT:  Arm lacerations








HISTORY OF PRESENT ILLNESS: This 77-year-old male patient presents to the 

emergency department by private vehicle with complaint of lacerations to the 

right arm that occurred approximately 1 hour ago.  Patient states that he was 

taking out the trash and got his right arm caught in a metal barbed wire fence.

  Bleeding has been mostly controlled at home.  Patient's family member 

cleansed the wounds and wrapped with a dressing.  There were no falls.  The 

patient denies any other injuries.  He is right-hand dominant.  He denies any 

numbness, tingling, or weakness in the arm or hand.  The patient rates the pain 

as burning and to/10.  The patient's tetanus shot is not up to date.  Patient 

is on Eliquis and a baby aspirin. 





  


REVIEW OF SYSTEMS:  A 6 system review of systems was completed with positives 

and pertinent negatives listed in the HPI. 








ALLERGIES: No known allergies








MEDICATIONS: Reviewed in chart.








PMH: Reviewed in chart








SOCIAL HISTORY: Lives at home.   He is a current everyday smoker.








PHYSICAL EXAM: Vital Signs: Reviewed Nurse's notes, vital signs stable.  GENERAL

: Pleasant and cooperative, in no acute distress, well-developed, well-

nourished.  Skin: There is a large superficial skin tear on the posterior-

lateral aspect of the right upper arm and forearm, measuring approximately 8 cm 

x 14 cm.  There are several small skin tears measuring approximately 2-3 cm 

each.  There is no foreign material in the wound and it looks clean. There is 

minimal active bleeding.  No deep structures such as tendons, bones, or 

significant blood vessels are seen in the base of the wound.  Strength and 

sensation of the right upper extremity intact.  Capillary refill less than two 

seconds with 2+ radial pulse.  Normal sensation to light and sharp touch.








EMERGENCY DEPARTMENT COURSE:  I examined the patient.  Findings consistent with 

multiple large skin tears, no deep lacerations.  Neurovascularly intact distal 

to the injuries.  Verbal consent was obtained to perform the procedure.  Using 

sterile technique the wound was cleansed with Betadine and copiously irrigated 

with sterile saline. The area was sterilely draped. The wound was explored and 

was as described above.  There were several jagged edges of thin skin along the 

edges of the wounds, this was debrided for better skin closure.  Approximately 

6 cm of debridement was performed.  The skin tears were repaired using 18 Steri-

Strip bandages to reapproximate skin tear edges as best as possible.  There 

were some areas of exposed skin where the top layer of skin was entirely 

sheared off.  Bacitracin ointment was applied to these areas and the entire 

wound was covered with Xeroform dressing.  The arm was then wrapped in sterile 

gauze and an Ace wrap.   The patient tolerated the procedure well. Hemostasis 

was achieved. The patient was given Td immunization. The patient was discharged 

home in good condition.





Medication Reconciliation: I attest that I have personally reviewed the patient'

s current medication list.





Patient was noted to have an elevated blood pressure during his evaluation, and 

was referred to follow-up with his primary care provider for further evaluation.





Patient was discussed with Dr. Graves, who also evaluated the patient and 

agrees with my assessment and plan.


Problem List


Medical Problems:


(1) HTN (hypertension)


Status: Chronic  





(2) Skin problems


Status: Chronic  





(3) Stomach problems


Status: Chronic  





(4) Ulcer


Status: Resolved  





(5) Urinary problem


Status: Chronic  











Current/Historical Medications


Scheduled


Apixaban (Eliquis), 5 MG PO BID


Aspirin (Aspirin Ec), 81 MG PO DAILY


Carvedilol (Coreg), 3.125 MG PO BID





Scheduled PRN


Acetaminophen (Tylenol), 650 MG PO BID PRN for Pain or Fever





Allergies


Coded Allergies:  


     No Known Allergies (Unverified , 5/17/14)





Vital Signs











  Date Time  Temp Pulse Resp B/P (MAP) Pulse Ox O2 Delivery O2 Flow Rate FiO2


 


5/6/18 16:50  58 18 179/92 100 Room Air  


 


5/6/18 15:10 36.7 74 18 163/83 97 Room Air  











Medications Administered











 Medications


  (Trade)  Dose


 Ordered  Sig/Hudson


 Route  Start Time


 Stop Time Status Last Admin


Dose Admin


 


 Diphtheria/


 Pertussis/Tetanus


 Vacc


  (Adacel Inj)  0.5 ml  ONCE ONCE


 IM.  5/6/18 15:30


 5/6/18 15:31 DC 5/6/18 15:40


0.5 ML











Departure Information


Impression





 Primary Impression:  


 Skin tear of right upper extremity





Dispostion


Home / Self-Care





Condition


GOOD





Referrals


No Doctor, Assigned (PCP)





Patient Instructions


ED Avulsion Dermal, ED Laceration Ext Sutr Stap Tape, [facility] Skysheet





Additional Instructions





DISCHARGE INSTRUCTIONS & TREATMENT:  





You have been evaluated and treated in the emergency department today for your 

right arm lacerations.





You had an update of your tetanus today.





Keep the wound clean and dry.  Wash gently with warm water and soap, pat dry.  

Do not scrub over the Steri-Strips, as this may cause him to come off 

prematurely.  The Steri-Strips will fall off gradually over the next several 

days.  You may apply a thin film of antibiotic ointment to the open areas for 

the next 3-4 days.  Then apply the Xeroform gauze to the area, wrap with 

sterile gauze and the Ace wrap for comfort.





You may take Tylenol as needed for pain.





Please follow-up with your primary care provider in 2 days to recheck the wound

, or return to the emergency department if you are unable to get in with your 

primary care provider.





Please seek immediate medical attention if you would develop any signs or 

symptoms of infection, including increasing redness, swelling, pain, pus 

drainage, streaking up the arm, fever/chills, or feeling ill, or any other 

concerns.

## 2024-01-09 NOTE — DIAGNOSTIC IMAGING REPORT
HEAD COMBO



CLINICAL HISTORY: Atrial fibrillation. Right arm weakness.    



COMPARISON STUDY:  No previous studies for comparison.



TECHNIQUE: Axial images of the head were obtained before and after intravenous

administration 94 cc Optiray 320 IV.



FINDINGS: No acute intracranial hemorrhage, midline shift or mass effect is

present. There is moderate atrophy with ventricular and sulcal enlargement. The

basilar cisterns are patent. There are no extra axial collections. No

intracranial mass or pathologic enhancement is present. Note is made of a 1.4 cm

old lacunar infarct within the left cerebellar hemisphere. An 8 mm hypodensity

within left basal ganglia could reflect an old lacunar infarct or prominent

perivascular space. There are no CT findings to suggest acute dural sinus

thrombosis or acute territorial infarct. There is mild mucosal thickening of the

sinuses with a small left maxillary sinus air-fluid level.



IMPRESSION:  



1. No acute intracranial findings.



2. No intracranial mass or pathologic enhancement.



3. Moderate atrophy and moderate small vessel disease.



4. Several old lacunar infarcts.



5. Mild sinus mucosal thickening and a small left maxillary sinus air-fluid

level. 









Electronically signed by:  Tristan Avendano M.D.

4/11/2018 4:36 PM



Dictated Date/Time:  4/11/2018 4:32 PM Spoke with the patient. Notified of 9 AM arrival time to the [facility].  Informed of current visitor policy.  Reminded of NPO and need for transportation. Patient verbalized understanding to all.    Crystal Lozoya MS, OTC  Clinical Assistant to Dr. Ross Dunbar [facility]